# Patient Record
Sex: FEMALE | Race: WHITE | NOT HISPANIC OR LATINO | Employment: FULL TIME | URBAN - METROPOLITAN AREA
[De-identification: names, ages, dates, MRNs, and addresses within clinical notes are randomized per-mention and may not be internally consistent; named-entity substitution may affect disease eponyms.]

---

## 2017-03-07 ENCOUNTER — ALLSCRIPTS OFFICE VISIT (OUTPATIENT)
Dept: OTHER | Facility: OTHER | Age: 52
End: 2017-03-07

## 2017-05-26 ENCOUNTER — ALLSCRIPTS OFFICE VISIT (OUTPATIENT)
Dept: OTHER | Facility: OTHER | Age: 52
End: 2017-05-26

## 2017-10-31 ENCOUNTER — ALLSCRIPTS OFFICE VISIT (OUTPATIENT)
Dept: OTHER | Facility: OTHER | Age: 52
End: 2017-10-31

## 2017-10-31 DIAGNOSIS — Z12.31 ENCOUNTER FOR SCREENING MAMMOGRAM FOR MALIGNANT NEOPLASM OF BREAST: ICD-10-CM

## 2017-10-31 DIAGNOSIS — E66.9 OBESITY: ICD-10-CM

## 2017-10-31 DIAGNOSIS — R23.2 FLUSHING: ICD-10-CM

## 2017-10-31 DIAGNOSIS — G47.00 INSOMNIA: ICD-10-CM

## 2017-11-01 NOTE — PROGRESS NOTES
Assessment  1  Acute upper respiratory infection (465 9) (J06 9)   2  Colon cancer screening (V76 51) (Z12 11)   3  Visit for screening mammogram (V76 12) (Z12 31)   4  Hot flashes (782 62) (R23 2)    Plan  Acute upper respiratory infection    · Fluticasone Propionate 50 MCG/ACT Nasal Suspension; USE 2 SPRAYS IN EACH  NOSTRIL ONCE DAILY   · PredniSONE 10 MG Oral Tablet; TAKE 3 TABLET Daily  Colon cancer screening    · COLONOSCOPY; Status:Active; Requested WNL:[id number];    · 1 - Shana Simmons MD, Buster Whiting (Gastroenterology) Co-Management  *  Status: Active   Requested for: [id number]  Care Summary provided  : Yes  Hot flashes, Insomnia    · (1) TSH WITH FT4 REFLEX; Status:Active; Requested for:31Oct2017;   Obesity, Class II, BMI 35 0-39 9, with comorbidity (see actual BMI)    · (1) COMPREHENSIVE METABOLIC PANEL; Status:Active; Requested for:31Oct2017;    · (1) LIPID PANEL, FASTING; Status:Active; Requested for:31Oct2017;   Visit for screening mammogram    · * MAMMO SCREENING BILATERAL W CAD; Status:Hold For - Scheduling; Requested  for:31Oct2017;     Discussion/Summary  Possible side effects of new medications were reviewed with the patient/guardian today  The treatment plan was reviewed with the patient/guardian  The patient/guardian understands and agrees with the treatment plan      Chief Complaint  Pt here for watery eyes, post nasal drip, headaches and sweats      History of Present Illness  HPI: watery eyes for few days along with runny nose for few days flashes on and off since last week last period was last year       Cold Symptoms: Shaheen Hoyt presents with complaints of cold symptoms     Associated symptoms include sneezing,-- nasal congestion,-- runny nose,-- post nasal drainage,-- dry cough-- and-- facial pressure, but-- no scratchy throat,-- no sore throat,-- no hoarseness,-- no productive cough,-- no facial pain,-- no headache,-- no plugged ear(s),-- no ear pain,-- no swollen lymph nodes,-- no wheezing,-- no shortness of breath,-- no fatigue,-- no weakness,-- no nausea,-- no vomiting,-- no diarrhea,-- no fever-- and-- no chills  Review of Systems    Constitutional: No fever, no chills, feels well, no tiredness, no recent weight gain or loss  ENT: as noted in HPI  Cardiovascular: no complaints of slow or fast heart rate, no chest pain, no palpitations, no leg claudication or lower extremity edema  Respiratory: cough,-- wheezing-- and-- shortness of breath during exertion, but-- no shortness of breath  Active Problems  1  Abnormal CT scan, pancreas or bile duct (793 3) (R93 2)   2  Acute upper respiratory infection (465 9) (J06 9)   3  Adult BMI > 30 (V85 30)   4  Allergic rhinitis due to pollen (477 0) (J30 1)   5  Asthma (493 90) (J45 909)   6  Esophageal reflux (530 81) (K21 9)   7  Insomnia (780 52) (G47 00)   8  Migraine, unspecified, not intractable, without status migrainosus (346 90) (G43 909)   9  Morbid obesity (278 01) (E66 01)   10  Obesity, Class II, BMI 35 0-39 9, with comorbidity (see actual BMI) (278 00) (E66 9)   11  Venous insufficiency (459 81) (I87 2)   12  Vitamin D deficiency (268 9) (E55 9)   13  Well adult on routine health check (V70 0) (Z00 00)    Past Medical History  1  History of Elevated liver enzymes (790 5) (R74 8)   2  History of Encounter for cervical Pap smear with pelvic exam (V76 2,V72 31) (Z01 419)   3  History of Encounter for screening colonoscopy (V76 51) (Z12 11)   4  History of Encounter for screening mammogram for malignant neoplasm of breast   (V76 12) (Z12 31)   5  History of Foot Pain (Soft Tissue) (729 5)   6  History of fatigue (V13 89) (Z87 898)   7  History of hematuria (V13 09) (Z87 448)   8  History of viral gastroenteritis (V12 09) (Z86 19)   9  History of Limb pain (729 5) (M79 609)   10  History of Multiple joint pain (719 49) (M25 50)   11  History of Onychomycosis of toenail (110 1) (B35 1)   12   History of Right knee pain (289 46) (M25 561) 13  History of Snoring (786 09) (R06 83)   14  History of Upper back pain on right side (724 5) (M54 9)  Active Problems And Past Medical History Reviewed: The active problems and past medical history were reviewed and updated today  Family History  Mother    1  Family history of Colon Cancer (V16 0)   2  Family history of Hypertension (V17 49)  Father    3  Family history of Alcoholism   4  Family history of Father  At Age ___  Sister    11  Family history of Hypertension (V17 49)  Paternal Aunt    10  Family history of Colon Cancer (V16 0)  Family History    7  Family history of Heart Disease (V17 49)  Family History Reviewed: The family history was reviewed and updated today  Social History   · Denied: History of Alcohol Use (History)   · Occassionally   · Denied: Daily Coffee Consumption (___ Cups/Day)   · Denied: History of Drug use   · Former smoker (V15 82) (J56 207)   · Quit 4 years ago  Syble Rony Sandra Rony Smoked for at least 20 years   half a pack to one pack of cigarettes a      day  The social history was reviewed and updated today  The social history was reviewed and is unchanged  Surgical History  1  History of Dilation And Curettage  Surgical History Reviewed: The surgical history was reviewed and updated today  Current Meds   1  Amoxicillin 875 MG Oral Tablet; TAKE 1 TABLET EVERY 12 HOURS DAILY; Therapy: 45WBV1651 to (Evaluate:2017)  Requested for: 89MZJ5244; Last   Rx:2017 Ordered   2  Diclofenac Sodium 1 % Transdermal Gel; apply to the affected area BID to TID as   needed; Therapy: 36WMK0045 to (Last Rx:2016)  Requested for: 10UTE1293 Ordered   3  HydrOXYzine HCl - 50 MG Oral Tablet; TAKE 1 TABLET AT BEDTIME; Therapy: 59NOK3980 to (Tawana De La Fuenteenin)  Requested for: 2016; Last   Rx:2016 Ordered   4  Ondansetron 4 MG Oral Tablet Disintegrating; TAKE 1 TABLET Every 6 hours PRN;    Therapy: 25QTX8035 to (Evaluate:2017)  Requested for: 06RKQ9759; Last   Rx:07Mar2017 Ordered   5  Pantoprazole Sodium 40 MG Oral Tablet Delayed Release; Take 1 tablet daily; Therapy: 06HXI0018 to (Ronit Pock)  Requested for: 02SWL9440; Last   Rx:30Nov2016 Ordered   6  Topiramate 50 MG Oral Tablet; take 1 tablet every twelve hours; Therapy: 38Cky9254 to (Evaluate:68Sad8749)  Requested for: 10Aug2016; Last   Rx:24Mar2016 Ordered   7  Ventolin  (90 Base) MCG/ACT Inhalation Aerosol Solution; INHALE 1 TO 2   PUFFS EVERY 6 HOURS AS NEEDED  Requested for: 71LTK2309; Last AE:39POP7894   Ordered   8  Vitamin D3 2000 UNIT Oral Capsule Recorded    The medication list was reviewed and updated today  Allergies  1  Bactrim DS TABS  2  Seasonal    Vitals   Recorded: 57XWK4115 08:27AM   Heart Rate 7   Respiration 16   Systolic 804   Diastolic 78   Weight 510 lb 2 oz   BMI Calculated 43 95   BSA Calculated 2 12     Physical Exam    Constitutional   General appearance: No acute distress, well appearing and well nourished  Ears, Nose, Mouth, and Throat   External inspection of ears and nose: Normal     Otoscopic examination: Tympanic membranes translucent with normal light reflex  Canals patent without erythema  Nasal mucosa, septum, and turbinates: Normal without edema or erythema  Oropharynx: Normal with no erythema, edema, exudate or lesions  Pulmonary   Respiratory effort: No increased work of breathing or signs of respiratory distress  Auscultation of lungs: Clear to auscultation  Cardiovascular   Palpation of heart: Normal PMI, no thrills  Auscultation of heart: Normal rate and rhythm, normal S1 and S2, without murmurs  Lymphatic   Palpation of lymph nodes in neck: No lymphadenopathy  Results/Data  PHQ-2 Adult Depression Screening 31Oct2017 08:30AM User, Ahs     Test Name Result Flag Reference   PHQ-2 Adult Depression Score 0     Over the last two weeks, how often have you been bothered by any of the following problems?   Little interest or pleasure in doing things: Not at all - 0  Feeling down, depressed, or hopeless: Not at all - 0   PHQ-2 Adult Depression Screening Negative         Signatures   Electronically signed by : Ruben Parker MD; Oct 31 2017  8:48AM EST                       (Author)

## 2018-01-13 VITALS
SYSTOLIC BLOOD PRESSURE: 138 MMHG | DIASTOLIC BLOOD PRESSURE: 82 MMHG | BODY MASS INDEX: 43.08 KG/M2 | RESPIRATION RATE: 16 BRPM | HEART RATE: 68 BPM | TEMPERATURE: 97.9 F | WEIGHT: 243.13 LBS | HEIGHT: 63 IN

## 2018-01-13 VITALS
HEART RATE: 7 BPM | WEIGHT: 248.13 LBS | DIASTOLIC BLOOD PRESSURE: 78 MMHG | BODY MASS INDEX: 43.95 KG/M2 | RESPIRATION RATE: 16 BRPM | SYSTOLIC BLOOD PRESSURE: 122 MMHG

## 2018-01-13 NOTE — RESULT NOTES
Verified Results  * XR ABDOMEN 1 VIEW KUB 14Apr2016 03:45PM Poli George Order Number: [id number]     Test Name Result Flag Reference   XR ABDOMEN 1 VIEW KUB (Report)     ABDOMEN     INDICATION: Hematuria     COMPARISON: 8/3/2015     VIEWS: AP supine; 2 images     FINDINGS:     No radiopaque renal calculi are seen     Multiple vascular calcifications are seen in the pelvis  No definite radiopaque ureteral stone     Nonobstructive bowel gas pattern  Osseous structures appear intact  IMPRESSION:     No radiopaque urinary tract calculi identified       Workstation performed: [id number]     Signed by:   Merlene Kearney MD   4/15/16       Plan  Hematuria    · Oxycodone-Acetaminophen 5-325 MG Oral Tablet (Percocet); TAKE 1 TABLET  EVERY 12 HOURS AS NEEDED FOR PAIN   · * CT ABDOMEN PELVIS WO CONTRAST; Status:Need Information - Financial  Authorization;  Requested for:15Apr2016;     Discussion/Summary   spoke with patient    will get CT scan abdomen/pelvic    pain control PRN   - Dr Priscila Owen   Electronically signed by : Ruben Parker MD; Apr 15 2016 10:01AM EST                       (Author)

## 2018-01-14 NOTE — MISCELLANEOUS
Message  Return to work or school:   Wade Castro is under my professional care  She was seen in my office on 11/01/2017   She is able to return to work on  11/01/2017      PATIENT WAS SEEN IN OUR OFFICE 10/31/2017 EXCUSE FROM WORK FROM 10/27/2017 TO 10/31/2017 MAY RETURN TO [employer] 11/01/2017  DR TAL GARCIA / LORENZA  Signatures   Electronically signed by :  Marquis Reyes, ; Oct 31 2017  8:55AM EST                       (Author)

## 2018-01-15 VITALS
RESPIRATION RATE: 20 BRPM | WEIGHT: 246 LBS | BODY MASS INDEX: 43.58 KG/M2 | HEART RATE: 115 BPM | OXYGEN SATURATION: 98 % | SYSTOLIC BLOOD PRESSURE: 100 MMHG | TEMPERATURE: 99 F | DIASTOLIC BLOOD PRESSURE: 78 MMHG

## 2018-01-15 NOTE — RESULT NOTES
Verified Results  (1) URINE CULTURE 12Apr2016 07:35PM Melisa George     Test Name Result Flag Reference   CLINICAL REPORT (Report)     Test:        Urine culture  Specimen Source:  Urine, Unspecified Source  Specimen Type:   Urine  Specimen Date:   4/12/2016 7:35 PM  Result Date:    4/13/2016 10:13 PM  Result Status:   Final result  Resulting Lab:   [address] [address]            Tel: [phone]                 CULTURE                                       ------------------                                   10,000-19,000 cfu/ml Mixed Contaminants X3       Discussion/Summary   negative urine culture    - Dr Jeanne Molina   Electronically signed by : Brent Haley MD; Apr 14 2016 10:45AM EST                       (Author)

## 2018-01-26 ENCOUNTER — OFFICE VISIT (OUTPATIENT)
Dept: URGENT CARE | Age: 53
End: 2018-01-26
Payer: COMMERCIAL

## 2018-01-26 VITALS
SYSTOLIC BLOOD PRESSURE: 138 MMHG | BODY MASS INDEX: 42.52 KG/M2 | HEART RATE: 92 BPM | OXYGEN SATURATION: 98 % | TEMPERATURE: 98.5 F | DIASTOLIC BLOOD PRESSURE: 90 MMHG | RESPIRATION RATE: 20 BRPM | WEIGHT: 240 LBS | HEIGHT: 63 IN

## 2018-01-26 DIAGNOSIS — F41.9 ANXIETY: Primary | ICD-10-CM

## 2018-01-26 DIAGNOSIS — F32.A DEPRESSION, UNSPECIFIED DEPRESSION TYPE: ICD-10-CM

## 2018-01-26 PROCEDURE — G0382 LEV 3 HOSP TYPE B ED VISIT: HCPCS | Performed by: FAMILY MEDICINE

## 2018-01-26 PROCEDURE — S9083 URGENT CARE CENTER GLOBAL: HCPCS | Performed by: FAMILY MEDICINE

## 2018-01-26 NOTE — PATIENT INSTRUCTIONS
Work note given to be out of work until seen by PCP next week  As we discussed, go to ER if symptoms become worse prior to next week  You can try Benadryl or Melatonin to see if this helps with sleep

## 2018-01-26 NOTE — PROGRESS NOTES
Assessment/Plan:    No problem-specific Assessment & Plan notes found for this encounter  Diagnoses and all orders for this visit:    Anxiety    Depression, unspecified depression type        Patient Instructions   Work note given to be out of work until seen by PCP next week  As we discussed, go to ER if symptoms become worse prior to next week  You can try Benadryl or Melatonin to see if this helps with sleep  Subjective:      Patient ID: Aayush Montoya is a 47 y o  female  This is a 47year old male here today with complaints increased stress/ anxiety at work  She states over the last 1 5 years she has had stress at work but over the last several months it has became worse  She has hard time sleeping at night  She states her supervisor yelled at her last night and she feels as though she is target since her employer was on strike and she returned to work  This was about 1 5 years ago, she states this became worse with new supervisor  She states she does not have stress at home and she feels well at home  She just has a hard time sleeping  She denies any thoughts of self harm or harming someone else  No suicidal ideation  She has a good support system at home   here today  The following portions of the patient's history were reviewed and updated as appropriate: allergies, current medications, past family history, past medical history, past social history, past surgical history and problem list     Review of Systems   Constitutional: Negative for activity change, chills and fatigue  Respiratory: Negative for cough, shortness of breath and wheezing  Cardiovascular: Negative for chest pain  Neurological: Negative for dizziness and light-headedness  Psychiatric/Behavioral: Positive for sleep disturbance  Negative for agitation, confusion, self-injury and suicidal ideas  The patient is nervous/anxious            Objective:  /90 (BP Location: Right arm, Patient Position: Sitting, Cuff Size: Large)   Pulse 92   Temp 98 5 °F (36 9 °C) (Temporal)   Resp 20   Ht 5' 3" (1 6 m)   Wt 109 kg (240 lb)   SpO2 98%   BMI 42 51 kg/m²      Physical Exam   Constitutional: She appears well-developed and well-nourished  Patient is tearful  HENT:   Head: Normocephalic  Neck: Normal range of motion  Cardiovascular: Normal rate and regular rhythm  Pulmonary/Chest: Effort normal and breath sounds normal  No respiratory distress  Nursing note and vitals reviewed

## 2018-01-26 NOTE — LETTER
January 26, 2018     Patient: August Mayadela   YOB: 1965   Date of Visit: 1/26/2018       To Whom It May Concern: It is my medical opinion that Luci Cool may return to work on 01/31/2018  If you have any questions or concerns, please don't hesitate to call           Sincerely,        [facility]    CC: No Recipients

## 2018-01-30 ENCOUNTER — OFFICE VISIT (OUTPATIENT)
Dept: FAMILY MEDICINE CLINIC | Facility: CLINIC | Age: 53
End: 2018-01-30
Payer: COMMERCIAL

## 2018-01-30 VITALS
RESPIRATION RATE: 16 BRPM | SYSTOLIC BLOOD PRESSURE: 132 MMHG | WEIGHT: 251.8 LBS | DIASTOLIC BLOOD PRESSURE: 88 MMHG | HEIGHT: 63 IN | BODY MASS INDEX: 44.61 KG/M2 | HEART RATE: 80 BPM

## 2018-01-30 DIAGNOSIS — F41.9 ANXIETY DISORDER, UNSPECIFIED TYPE: Primary | ICD-10-CM

## 2018-01-30 PROCEDURE — 99214 OFFICE O/P EST MOD 30 MIN: CPT | Performed by: FAMILY MEDICINE

## 2018-01-30 RX ORDER — ACETAMINOPHEN 160 MG
10000 TABLET,DISINTEGRATING ORAL
COMMUNITY

## 2018-01-30 RX ORDER — HYDROXYZINE 50 MG/1
1 TABLET, FILM COATED ORAL
COMMUNITY
Start: 2016-08-10 | End: 2018-02-19 | Stop reason: SDUPTHER

## 2018-01-30 RX ORDER — ESCITALOPRAM OXALATE 10 MG/1
10 TABLET ORAL DAILY
Qty: 30 TABLET | Refills: 0 | Status: SHIPPED | OUTPATIENT
Start: 2018-01-30 | End: 2018-02-19

## 2018-01-30 RX ORDER — ONDANSETRON 4 MG/1
1 TABLET, ORALLY DISINTEGRATING ORAL EVERY 6 HOURS PRN
COMMUNITY
Start: 2017-03-07 | End: 2018-01-30 | Stop reason: ALTCHOICE

## 2018-01-30 RX ORDER — PANTOPRAZOLE SODIUM 40 MG/1
1 TABLET, DELAYED RELEASE ORAL DAILY
COMMUNITY
Start: 2013-01-11 | End: 2019-11-25 | Stop reason: ALTCHOICE

## 2018-01-30 RX ORDER — PREDNISONE 10 MG/1
3 TABLET ORAL DAILY
COMMUNITY
Start: 2017-10-31 | End: 2018-01-30 | Stop reason: ALTCHOICE

## 2018-01-30 RX ORDER — AMOXICILLIN 875 MG/1
1 TABLET, COATED ORAL EVERY 12 HOURS
COMMUNITY
Start: 2017-05-26 | End: 2018-01-30 | Stop reason: ALTCHOICE

## 2018-01-30 RX ORDER — TOPIRAMATE 50 MG/1
1 TABLET, FILM COATED ORAL EVERY 12 HOURS
COMMUNITY
Start: 2013-02-22 | End: 2019-11-25 | Stop reason: ALTCHOICE

## 2018-01-30 RX ORDER — ALBUTEROL SULFATE 90 UG/1
1-2 AEROSOL, METERED RESPIRATORY (INHALATION) EVERY 6 HOURS PRN
COMMUNITY
End: 2020-09-04 | Stop reason: SDUPTHER

## 2018-01-30 RX ORDER — ALPRAZOLAM 0.5 MG/1
0.5 TABLET ORAL
Qty: 30 TABLET | Refills: 0 | Status: SHIPPED | OUTPATIENT
Start: 2018-01-30

## 2018-01-30 RX ORDER — FLUTICASONE PROPIONATE 50 MCG
2 SPRAY, SUSPENSION (ML) NASAL DAILY
COMMUNITY
Start: 2017-10-31 | End: 2021-07-07 | Stop reason: ALTCHOICE

## 2018-01-30 NOTE — LETTER
January 30, 2018     Patient: Washington Cali   YOB: 1965   Date of Visit: 1/30/2018       To Whom it May Concern:    Darrick Harkins is under my professional care  She was seen in my office on 1/30/2018  She may return to school on 2/19/18  If you have any questions or concerns, please don't hesitate to call           Sincerely,          Mimi Estrada MD        CC: No Recipients

## 2018-01-30 NOTE — PROGRESS NOTES
Assessment/Plan:         Diagnoses and all orders for this visit:    Anxiety disorder, unspecified type  -     escitalopram (LEXAPRO) 10 mg tablet; Take 1 tablet (10 mg total) by mouth daily  -     ALPRAZolam (XANAX) 0 5 mg tablet; Take 1 tablet (0 5 mg total) by mouth daily at bedtime as needed for anxiety      spent 25 minutes and more 15 minutes was spent with counseling   Work note given up to 2/19/18    Subjective:       Patient ID: Rafal Belle is a 47 y o  female  She is here for anxiety  Harrassment at work recently, and nothing has been done about this yet  Crying all the time   Wants to take some time from work     Anxiety   Presents for initial visit  Onset was 6 to 12 months ago  The problem has been gradually worsening  Symptoms include depressed mood, excessive worry, hyperventilation, nervous/anxious behavior and panic  Patient reports no chest pain, compulsions, confusion, decreased concentration, dizziness, dry mouth, feeling of choking, impotence, insomnia, irritability, malaise, muscle tension, nausea, obsessions, restlessness, shortness of breath or suicidal ideas  The following portions of the patient's history were reviewed and updated as appropriate: allergies, current medications, past family history, past medical history, past social history, past surgical history and problem list     Review of Systems   Constitutional: Negative for irritability  Respiratory: Negative for shortness of breath  Cardiovascular: Negative for chest pain  Gastrointestinal: Negative for nausea  Genitourinary: Negative for impotence  Neurological: Negative for dizziness  Psychiatric/Behavioral: Negative for confusion, decreased concentration and suicidal ideas  The patient is nervous/anxious  The patient does not have insomnia            Past Medical History:   Diagnosis Date    Elevated liver enzymes     last assessed: 8/11/2015    Hematuria     last assessed: 4/12/2016    Onychomycosis of toenail     last assessed: 11/15/2012    Viral gastroenteritis     last assessed: 5/7/2017     Past Surgical History:   Procedure Laterality Date    DILATION AND CURETTAGE OF UTERUS       Social History     Social History    Marital status: Single     Spouse name: N/A    Number of children: N/A    Years of education: N/A     Occupational History    Not on file  Social History Main Topics    Smoking status: Never Smoker    Smokeless tobacco: Never Used      Comment: former smoker quit 4 years ago    smoked for at least 20 years    half a pack to one pack of cigarettes a day    Alcohol use No    Drug use: No    Sexual activity: Not on file     Other Topics Concern    Not on file     Social History Narrative    No narrative on file     Allergies   Allergen Reactions    Seasonal Ic  [Cholestatin]     Sulfamethoxazole-Trimethoprim      Other reaction(s): yeast infection  Annotation - [id number]: reaction is rash         Family History   Problem Relation Age of Onset    Colon cancer Mother     Hypertension Mother     Alcohol abuse Father     Hypertension Sister     Colon cancer Paternal Aunt     Heart disease Family            Current Outpatient Prescriptions:     albuterol (VENTOLIN HFA) 90 mcg/act inhaler, Inhale 1-2 puffs every 6 (six) hours as needed, Disp: , Rfl:     Cholecalciferol (VITAMIN D3) 2000 units capsule, Take by mouth, Disp: , Rfl:     diclofenac sodium (VOLTAREN) 1 %, Place on the skin, Disp: , Rfl:     fluticasone (FLONASE) 50 mcg/act nasal spray, 2 sprays into each nostril daily, Disp: , Rfl:     hydrOXYzine HCL (ATARAX) 50 mg tablet, Take 1 tablet by mouth, Disp: , Rfl:     pantoprazole (PROTONIX) 40 mg tablet, Take 1 tablet by mouth daily, Disp: , Rfl:     topiramate (TOPAMAX) 50 MG tablet, Take 1 tablet by mouth every 12 (twelve) hours, Disp: , Rfl:       Vitals:    01/30/18 1538   BP: 132/88   Pulse: 80   Resp: 16         Objective:     Physical Exam   Constitutional: She is oriented to person, place, and time  She appears well-developed  HENT:   Head: Normocephalic  Eyes: Conjunctivae are normal  Pupils are equal, round, and reactive to light  Neck: Normal range of motion  Cardiovascular: Normal rate  Pulmonary/Chest: Effort normal    Neurological: She is alert and oriented to person, place, and time  Psychiatric: Judgment normal  Her mood appears anxious  Her speech is rapid and/or pressured  She is not agitated and not combative   Cognition and memory are normal

## 2018-01-31 ENCOUNTER — TELEPHONE (OUTPATIENT)
Dept: FAMILY MEDICINE CLINIC | Facility: CLINIC | Age: 53
End: 2018-01-31

## 2018-02-13 ENCOUNTER — OFFICE VISIT (OUTPATIENT)
Dept: BEHAVIORAL/MENTAL HEALTH CLINIC | Facility: CLINIC | Age: 53
End: 2018-02-13
Payer: COMMERCIAL

## 2018-02-13 DIAGNOSIS — F32.A DEPRESSION, UNSPECIFIED DEPRESSION TYPE: ICD-10-CM

## 2018-02-13 DIAGNOSIS — F41.9 ANXIETY: Primary | ICD-10-CM

## 2018-02-13 PROCEDURE — 90834 PSYTX W PT 45 MINUTES: CPT | Performed by: SOCIAL WORKER

## 2018-02-13 NOTE — PATIENT INSTRUCTIONS
Provided supportive therapy  Reviewed coping strategies for anxiety and depression  Will consult with her PCP given current struggles  Will develop our plan moving forward after this  Provided my contact information

## 2018-02-13 NOTE — PROGRESS NOTES
Assessment/Plan:      There are no diagnoses linked to this encounter  Subjective:     Patient ID: Rafal Belle is a 47 y o  female  Since on current medication regimen, crying spells have ceased and she has been feeling less anxious  Will return to work on Monday so anxiety surrounding return  Continues to have difficulty staying asleep and is up hourly  Continues to report very little energy and motivation  Has had no side effects from currewnt dose of Lexapro  She is accompanied by spouse  She is verbal, cooperative and well oriented  Review of Systems   Psychiatric/Behavioral: Positive for dysphoric mood and sleep disturbance  The patient is nervous/anxious  Objective:     Physical Exam   Psychiatric: Her speech is normal and behavior is normal  Judgment normal  Her mood appears anxious  Cognition and memory are normal  She exhibits a depressed mood  Continued problems with lack of energy and motivation  No Si  Has good support system

## 2018-02-19 ENCOUNTER — OFFICE VISIT (OUTPATIENT)
Dept: FAMILY MEDICINE CLINIC | Facility: CLINIC | Age: 53
End: 2018-02-19
Payer: COMMERCIAL

## 2018-02-19 VITALS
WEIGHT: 254.8 LBS | HEART RATE: 76 BPM | BODY MASS INDEX: 45.14 KG/M2 | DIASTOLIC BLOOD PRESSURE: 84 MMHG | SYSTOLIC BLOOD PRESSURE: 136 MMHG | RESPIRATION RATE: 16 BRPM

## 2018-02-19 DIAGNOSIS — F32.A ANXIETY AND DEPRESSION: Primary | ICD-10-CM

## 2018-02-19 DIAGNOSIS — F41.9 ANXIETY AND DEPRESSION: Primary | ICD-10-CM

## 2018-02-19 PROCEDURE — 99214 OFFICE O/P EST MOD 30 MIN: CPT | Performed by: FAMILY MEDICINE

## 2018-02-19 RX ORDER — ESCITALOPRAM OXALATE 20 MG/1
20 TABLET ORAL DAILY
Qty: 90 TABLET | Refills: 0 | Status: SHIPPED | OUTPATIENT
Start: 2018-02-19 | End: 2018-08-08 | Stop reason: SDUPTHER

## 2018-02-19 RX ORDER — HYDROXYZINE 50 MG/1
50 TABLET, FILM COATED ORAL EVERY 8 HOURS PRN
Qty: 30 TABLET | Refills: 0 | Status: SHIPPED | OUTPATIENT
Start: 2018-02-19 | End: 2019-11-25 | Stop reason: ALTCHOICE

## 2018-02-19 NOTE — LETTER
February 19, 2018     Patient: Emily Nguyen   YOB: 1965   Date of Visit: 2/19/2018       To Whom It May Concern: It is my medical opinion that Codie Ashley may return to work on 3/12/18  If you have any questions or concerns, please don't hesitate to call           Sincerely,        Manjit Larsen MD

## 2018-02-19 NOTE — PROGRESS NOTES
Assessment/Plan:       Diagnoses and all orders for this visit:    Anxiety and depression  -     hydrOXYzine HCL (ATARAX) 50 mg tablet; Take 1 tablet (50 mg total) by mouth every 8 (eight) hours as needed for itching  -     escitalopram (LEXAPRO) 20 mg tablet; Take 1 tablet (20 mg total) by mouth daily      extended her note to 3/12/18  To see Sacha Avitia this week   F/u in 3 weeks   spent 25 minutes and more than 50% of time spent counseling   Subjective:      Patient ID: Rhonda Davidson is a 47 y o  female  Here to f/u   Been out of work 3 weeks   Feeling better but not ready to go back to work yet  There are days that she cant get out of bed to do anything     Anxiety   Presents for follow-up visit  Symptoms include depressed mood, excessive worry and nervous/anxious behavior  Patient reports no chest pain, compulsions, confusion, decreased concentration, dizziness, dry mouth, feeling of choking, hyperventilation, impotence, insomnia, irritability, malaise, muscle tension, nausea, obsessions, palpitations, panic, restlessness, shortness of breath or suicidal ideas  Symptoms occur most days  The severity of symptoms is mild  The quality of sleep is fair  Compliance with medications is %  The following portions of the patient's history were reviewed and updated as appropriate: allergies, current medications, past family history, past medical history, past social history, past surgical history and problem list     Review of Systems   Constitutional: Negative for irritability  Respiratory: Negative for shortness of breath  Cardiovascular: Negative for chest pain and palpitations  Gastrointestinal: Negative for nausea  Genitourinary: Negative for impotence  Neurological: Negative for dizziness  Psychiatric/Behavioral: Negative for confusion, decreased concentration and suicidal ideas  The patient is nervous/anxious  The patient does not have insomnia              Past Medical History: Diagnosis Date    Elevated liver enzymes     last assessed: 8/11/2015    Hematuria     last assessed: 4/12/2016    Onychomycosis of toenail     last assessed: 11/15/2012    Viral gastroenteritis     last assessed: 5/7/2017     Past Surgical History:   Procedure Laterality Date    DILATION AND CURETTAGE OF UTERUS       Social History     Social History    Marital status: Single     Spouse name: N/A    Number of children: N/A    Years of education: N/A     Occupational History    Not on file  Social History Main Topics    Smoking status: Never Smoker    Smokeless tobacco: Never Used      Comment: former smoker quit 4 years ago    smoked for at least 20 years    half a pack to one pack of cigarettes a day    Alcohol use No    Drug use: No    Sexual activity: Not on file     Other Topics Concern    Not on file     Social History Narrative    No narrative on file     Allergies   Allergen Reactions    Seasonal Ic  [Cholestatin]     Sulfamethoxazole-Trimethoprim      Other reaction(s): yeast infection  Annotation - [id number]: reaction is rash         Family History   Problem Relation Age of Onset    Colon cancer Mother     Hypertension Mother     Alcohol abuse Father     Hypertension Sister     Colon cancer Paternal Aunt     Heart disease Family        Current Outpatient Prescriptions:     albuterol (VENTOLIN HFA) 90 mcg/act inhaler, Inhale 1-2 puffs every 6 (six) hours as needed, Disp: , Rfl:     ALPRAZolam (XANAX) 0 5 mg tablet, Take 1 tablet (0 5 mg total) by mouth daily at bedtime as needed for anxiety, Disp: 30 tablet, Rfl: 0    Cholecalciferol (VITAMIN D3) 2000 units capsule, Take by mouth, Disp: , Rfl:     diclofenac sodium (VOLTAREN) 1 %, Place on the skin, Disp: , Rfl:     escitalopram (LEXAPRO) 20 mg tablet, Take 1 tablet (20 mg total) by mouth daily, Disp: 90 tablet, Rfl: 0    fluticasone (FLONASE) 50 mcg/act nasal spray, 2 sprays into each nostril daily, Disp: , Rfl:    hydrOXYzine HCL (ATARAX) 50 mg tablet, Take 1 tablet (50 mg total) by mouth every 8 (eight) hours as needed for itching, Disp: 30 tablet, Rfl: 0    pantoprazole (PROTONIX) 40 mg tablet, Take 1 tablet by mouth daily, Disp: , Rfl:     topiramate (TOPAMAX) 50 MG tablet, Take 1 tablet by mouth every 12 (twelve) hours, Disp: , Rfl:       Objective:    /84   Pulse 76   Resp 16   Wt 116 kg (254 lb 12 8 oz)   BMI 45 14 kg/m²        Physical Exam   Constitutional: She appears well-developed  HENT:   Head: Normocephalic and atraumatic  Eyes: Conjunctivae are normal  Pupils are equal, round, and reactive to light  Cardiovascular: Normal rate and regular rhythm      Pulmonary/Chest: Effort normal and breath sounds normal    Psychiatric: Thought content normal    + anxious, crying

## 2018-02-20 ENCOUNTER — OFFICE VISIT (OUTPATIENT)
Dept: BEHAVIORAL/MENTAL HEALTH CLINIC | Facility: CLINIC | Age: 53
End: 2018-02-20
Payer: COMMERCIAL

## 2018-02-20 DIAGNOSIS — F41.9 ANXIETY AND DEPRESSION: Primary | ICD-10-CM

## 2018-02-20 DIAGNOSIS — F32.A ANXIETY AND DEPRESSION: Primary | ICD-10-CM

## 2018-02-20 PROCEDURE — 90834 PSYTX W PT 45 MINUTES: CPT | Performed by: SOCIAL WORKER

## 2018-02-20 NOTE — PATIENT INSTRUCTIONS
Reviewed coping strategies for anxiety and depression  Encouraged to commit to exercise daily as well as increasing social contact/activity  Agrees to contact me overe next three weeks to report on response to current meds

## 2018-02-20 NOTE — PSYCH
Assessment/Plan:      There are no diagnoses linked to this encounter  Subjective:     Patient ID: Aayush Montoya is a 47 y o  female  [place] continues to struggle with depressive symptoms as well as feeling anxious and overwhelmed  Struggling with lack of energy and motivation as well as crying spells  Will not return to work for three weeks  Will monitor response to current medications over this time  She is pleasant, verbal, cooperatrive and well oriented during session  Review of Systems   Psychiatric/Behavioral: Positive for dysphoric mood  The patient is nervous/anxious  Objective:     Physical Exam   Psychiatric: Her speech is normal and behavior is normal  Judgment and thought content normal  Her mood appears anxious  Cognition and memory are normal  She exhibits a depressed mood  Denies any Si  Has good support system

## 2018-02-26 DIAGNOSIS — F41.9 ANXIETY DISORDER, UNSPECIFIED TYPE: ICD-10-CM

## 2018-02-26 RX ORDER — ESCITALOPRAM OXALATE 10 MG/1
10 TABLET ORAL DAILY
Qty: 30 TABLET | Refills: 0 | OUTPATIENT
Start: 2018-02-26

## 2018-03-13 ENCOUNTER — OFFICE VISIT (OUTPATIENT)
Dept: FAMILY MEDICINE CLINIC | Facility: CLINIC | Age: 53
End: 2018-03-13
Payer: COMMERCIAL

## 2018-03-13 VITALS
DIASTOLIC BLOOD PRESSURE: 82 MMHG | BODY MASS INDEX: 44.97 KG/M2 | SYSTOLIC BLOOD PRESSURE: 132 MMHG | HEART RATE: 72 BPM | WEIGHT: 253.8 LBS | RESPIRATION RATE: 16 BRPM | HEIGHT: 63 IN

## 2018-03-13 DIAGNOSIS — F41.9 ANXIETY: ICD-10-CM

## 2018-03-13 DIAGNOSIS — K21.9 GASTROESOPHAGEAL REFLUX DISEASE WITHOUT ESOPHAGITIS: ICD-10-CM

## 2018-03-13 DIAGNOSIS — J45.20 MILD INTERMITTENT ASTHMA WITHOUT COMPLICATION: ICD-10-CM

## 2018-03-13 DIAGNOSIS — F51.01 PRIMARY INSOMNIA: Primary | ICD-10-CM

## 2018-03-13 PROCEDURE — 99214 OFFICE O/P EST MOD 30 MIN: CPT | Performed by: FAMILY MEDICINE

## 2018-03-13 RX ORDER — FLUTICASONE PROPIONATE 110 UG/1
1 AEROSOL, METERED RESPIRATORY (INHALATION) 2 TIMES DAILY
Qty: 1 INHALER | Refills: 2 | Status: SHIPPED | OUTPATIENT
Start: 2018-03-13 | End: 2020-11-27 | Stop reason: SDUPTHER

## 2018-03-13 NOTE — PROGRESS NOTES
Assessment/Plan:       Diagnoses and all orders for this visit:    Primary insomnia    Anxiety    Gastroesophageal reflux disease without esophagitis    Mild intermittent asthma without complication  -     fluticasone (FLOVENT HFA) 110 MCG/ACT inhaler; Inhale 1 puff 2 (two) times a day        Subjective:      Patient ID: Christopher Webb is a 47 y o  female  Doing well on Lexapro  Less crying spell  Anxiety   Presents for follow-up visit  Symptoms include excessive worry and nervous/anxious behavior  Patient reports no chest pain, compulsions, confusion, decreased concentration, depressed mood, dizziness, dry mouth, feeling of choking, hyperventilation, impotence, insomnia, irritability, malaise, muscle tension, nausea, obsessions, palpitations, panic, restlessness, shortness of breath or suicidal ideas  Symptoms occur occasionally  The severity of symptoms is mild  The quality of sleep is good  Her past medical history is significant for asthma  Compliance with medications is %  Asthma   There is no chest tightness, cough, difficulty breathing, frequent throat clearing, hemoptysis, hoarse voice, shortness of breath, sputum production or wheezing  This is a chronic problem  The current episode started more than 1 year ago  The problem occurs intermittently  The problem has been gradually improving  Pertinent negatives include no appetite change, chest pain, dyspnea on exertion, ear congestion, ear pain, fever, headaches, heartburn, malaise/fatigue, myalgias, nasal congestion, orthopnea, PND, postnasal drip, rhinorrhea, sneezing, sore throat, sweats, trouble swallowing or weight loss  Her symptoms are aggravated by climbing stairs  Her symptoms are alleviated by nothing  She reports minimal improvement on treatment  Her past medical history is significant for asthma  There is no history of bronchiectasis, bronchitis, COPD, emphysema or pneumonia         The following portions of the patient's history were reviewed and updated as appropriate: allergies, current medications, past family history, past medical history, past social history, past surgical history and problem list     Review of Systems   Constitutional: Negative for appetite change, fever, irritability, malaise/fatigue and weight loss  HENT: Negative for ear pain, hoarse voice, postnasal drip, rhinorrhea, sneezing, sore throat and trouble swallowing  Respiratory: Negative for cough, hemoptysis, sputum production, shortness of breath and wheezing  Cardiovascular: Negative for chest pain, dyspnea on exertion, palpitations and PND  Gastrointestinal: Negative for heartburn and nausea  Genitourinary: Negative for impotence  Musculoskeletal: Negative for myalgias  Neurological: Negative for dizziness and headaches  Psychiatric/Behavioral: Negative for confusion, decreased concentration and suicidal ideas  The patient is nervous/anxious  The patient does not have insomnia  No past medical history on file  Past Surgical History:   Procedure Laterality Date    DILATION AND CURETTAGE OF UTERUS       Social History     Social History    Marital status: Single     Spouse name: N/A    Number of children: N/A    Years of education: N/A     Occupational History    Not on file  Social History Main Topics    Smoking status: Never Smoker    Smokeless tobacco: Never Used      Comment: former smoker quit 4 years ago    smoked for at least 20 years    half a pack to one pack of cigarettes a day    Alcohol use No    Drug use: No    Sexual activity: Not on file     Other Topics Concern    Not on file     Social History Narrative    No narrative on file     Allergies   Allergen Reactions    Seasonal Ic  [Cholestatin]     Sulfamethoxazole-Trimethoprim      Other reaction(s): yeast infection  Annotation - [id number]: reaction is rash         Family History   Problem Relation Age of Onset    Colon cancer Mother     Hypertension Mother     Alcohol abuse Father     Hypertension Sister     Colon cancer Paternal Aunt     Heart disease Family            Current Outpatient Prescriptions:     albuterol (VENTOLIN HFA) 90 mcg/act inhaler, Inhale 1-2 puffs every 6 (six) hours as needed, Disp: , Rfl:     ALPRAZolam (XANAX) 0 5 mg tablet, Take 1 tablet (0 5 mg total) by mouth daily at bedtime as needed for anxiety, Disp: 30 tablet, Rfl: 0    Cholecalciferol (VITAMIN D3) 2000 units capsule, Take by mouth, Disp: , Rfl:     diclofenac sodium (VOLTAREN) 1 %, Place on the skin, Disp: , Rfl:     escitalopram (LEXAPRO) 20 mg tablet, Take 1 tablet (20 mg total) by mouth daily, Disp: 90 tablet, Rfl: 0    fluticasone (FLONASE) 50 mcg/act nasal spray, 2 sprays into each nostril daily, Disp: , Rfl:     fluticasone (FLOVENT HFA) 110 MCG/ACT inhaler, Inhale 1 puff 2 (two) times a day, Disp: 1 Inhaler, Rfl: 2    hydrOXYzine HCL (ATARAX) 50 mg tablet, Take 1 tablet (50 mg total) by mouth every 8 (eight) hours as needed for itching, Disp: 30 tablet, Rfl: 0    pantoprazole (PROTONIX) 40 mg tablet, Take 1 tablet by mouth daily, Disp: , Rfl:     topiramate (TOPAMAX) 50 MG tablet, Take 1 tablet by mouth every 12 (twelve) hours, Disp: , Rfl:     Objective:      /82   Pulse 72   Resp 16   Ht 5' 3" (1 6 m)   Wt 115 kg (253 lb 12 8 oz)   BMI 44 96 kg/m²          Physical Exam   Constitutional: She is oriented to person, place, and time  Vital signs are normal  She appears well-developed and well-nourished  HENT:   Head: Normocephalic and atraumatic  Nose: Nose normal    Mouth/Throat: Oropharynx is clear and moist    Eyes: Pupils are equal, round, and reactive to light  Neck: Normal range of motion  Neck supple  No thyromegaly present  Cardiovascular: Normal rate and regular rhythm  No murmur heard  Pulmonary/Chest: Effort normal and breath sounds normal    Abdominal: Soft  Bowel sounds are normal    Musculoskeletal: Normal range of motion  She exhibits no edema or deformity  Neurological: She is alert and oriented to person, place, and time  She has normal reflexes  Skin: Skin is warm  No rash noted  No erythema  Psychiatric: She has a normal mood and affect   Her behavior is normal

## 2018-06-18 ENCOUNTER — OFFICE VISIT (OUTPATIENT)
Dept: FAMILY MEDICINE CLINIC | Facility: CLINIC | Age: 53
End: 2018-06-18
Payer: COMMERCIAL

## 2018-06-18 VITALS
RESPIRATION RATE: 14 BRPM | HEART RATE: 84 BPM | DIASTOLIC BLOOD PRESSURE: 80 MMHG | SYSTOLIC BLOOD PRESSURE: 132 MMHG | BODY MASS INDEX: 45.29 KG/M2 | HEIGHT: 63 IN | WEIGHT: 255.6 LBS

## 2018-06-18 DIAGNOSIS — M25.561 ACUTE PAIN OF RIGHT KNEE: ICD-10-CM

## 2018-06-18 DIAGNOSIS — Z00.00 WELL ADULT EXAM: Primary | ICD-10-CM

## 2018-06-18 DIAGNOSIS — Z12.11 COLON CANCER SCREENING: ICD-10-CM

## 2018-06-18 DIAGNOSIS — IMO0001 OBESITY, CLASS II, BMI 35.0-39.9, WITH COMORBIDITY (SEE ACTUAL BMI): ICD-10-CM

## 2018-06-18 DIAGNOSIS — E55.9 VITAMIN D DEFICIENCY: ICD-10-CM

## 2018-06-18 DIAGNOSIS — Z12.31 VISIT FOR SCREENING MAMMOGRAM: ICD-10-CM

## 2018-06-18 PROCEDURE — 99396 PREV VISIT EST AGE 40-64: CPT | Performed by: FAMILY MEDICINE

## 2018-06-18 NOTE — PROGRESS NOTES
Julia Fernandez was seen today for follow-up  Diagnoses and all orders for this visit:    Well adult exam    Obesity, Class II, BMI 35 0-39 9, with comorbidity (see actual BMI)  -     Comprehensive metabolic panel  -     Lipid Panel with Direct LDL reflex    Acute pain of right knee  -     Ambulatory referral to Orthopedic Surgery; Future  -     XR knee 3 vw right non injury; Future    Vitamin D deficiency  -     Vitamin D 25 hydroxy    Visit for screening mammogram  -     Mammo screening bilateral w cad; Future    Colon cancer screening  -     Ambulatory referral to Gastroenterology; Future      Patient Counseling:  --Nutrition: Stressed importance of moderation in sodium/caffeine intake, saturated fat and cholesterol, caloric balance, sufficient intake of fresh fruits, vegetables, fiber, calcium, iron, and 1 mg of folate supplement per day   --Discussed  calcium supplement, and the daily use of baby aspirin  --Exercise: Stressed the importance of regular exercise  --Substance Abuse: Discussed cessation/primary prevention of tobacco, alcohol, or other drug use; driving or other dangerous activities under the influence; availability of treatment for abuse  --Sexuality: Discussed sexually transmitted diseases, partner selection, use of condoms, avoidance of unintended pregnancy  and contraceptive alternatives  --Injury prevention: Discussed safety belts, safety helmets, smoke detector, smoking near bedding or upholstery  --Dental health: Discussed importance of regular tooth brushing, flossing, and dental visits  --Immunizations reviewed  --Discussed benefits of screening colonoscopy    Chief Complaint   Patient presents with    Follow-up     pt here for follow up appt knee has gotten worse as well        Knee Pain    The incident occurred more than 1 week ago  There was no injury mechanism  The pain is present in the right knee  The pain is at a severity of 3/10  The pain is mild   Associated symptoms include an inability to bear weight  Pertinent negatives include no muscle weakness  She has tried acetaminophen, non-weight bearing and NSAIDs for the symptoms  The treatment provided mild relief  Patient here for a comprehensive physical exam The patient reports no problems other than right knee pain daily, she does physical work     Do you take any herbs or supplements that were not prescribed by a doctor? no   Are you taking calcium supplements? no   Are you taking aspirin daily? no  How often do you exercise? Not able to exercise due to knee pain  Diet? 2 servings of fruits and vegetables   Dental visit:  Yearly     Vision test: wears glasses     Colonoscopy:  Never had one done      History:  LMP: No LMP recorded  Patient has had a hysterectomy  Last pap date:   Abnormal pap? no  : 2  Para: 2        History   Sexual Activity    Sexual activity: Not on file         Review of Systems   Constitutional: Negative  HENT: Negative  Eyes: Negative  Respiratory: Negative  Cardiovascular: Negative  Gastrointestinal: Negative  Endocrine: Negative  Genitourinary: Negative  Musculoskeletal: Positive for arthralgias  Skin: Negative  Allergic/Immunologic: Negative  Neurological: Negative  Hematological: Negative  Psychiatric/Behavioral: Negative  Family History   Problem Relation Age of Onset    Colon cancer Mother     Hypertension Mother     Alcohol abuse Father     Hypertension Sister     Colon cancer Paternal Aunt     Heart disease Family        No past medical history on file  Social History     Social History    Marital status: Single     Spouse name: N/A    Number of children: N/A    Years of education: N/A     Occupational History    Not on file  Social History Main Topics    Smoking status: Never Smoker    Smokeless tobacco: Never Used      Comment: former smoker quit 4 years ago    smoked for at least 20 years    half a pack to one pack of cigarettes a day    Alcohol use No    Drug use: No    Sexual activity: Not on file     Other Topics Concern    Not on file     Social History Narrative    No narrative on file       Current Outpatient Prescriptions on File Prior to Visit   Medication Sig Dispense Refill    albuterol (VENTOLIN HFA) 90 mcg/act inhaler Inhale 1-2 puffs every 6 (six) hours as needed      ALPRAZolam (XANAX) 0 5 mg tablet Take 1 tablet (0 5 mg total) by mouth daily at bedtime as needed for anxiety 30 tablet 0    Cholecalciferol (VITAMIN D3) 2000 units capsule Take by mouth      diclofenac sodium (VOLTAREN) 1 % Place on the skin      escitalopram (LEXAPRO) 20 mg tablet Take 1 tablet (20 mg total) by mouth daily 90 tablet 0    fluticasone (FLONASE) 50 mcg/act nasal spray 2 sprays into each nostril daily      fluticasone (FLOVENT HFA) 110 MCG/ACT inhaler Inhale 1 puff 2 (two) times a day 1 Inhaler 2    hydrOXYzine HCL (ATARAX) 50 mg tablet Take 1 tablet (50 mg total) by mouth every 8 (eight) hours as needed for itching 30 tablet 0    pantoprazole (PROTONIX) 40 mg tablet Take 1 tablet by mouth daily      topiramate (TOPAMAX) 50 MG tablet Take 1 tablet by mouth every 12 (twelve) hours       No current facility-administered medications on file prior to visit  Allergies   Allergen Reactions    Seasonal Ic  [Cholestatin]     Sulfamethoxazole-Trimethoprim      Other reaction(s): yeast infection  Annotation - [id number]: reaction is rash         Vitals:    06/18/18 1251   BP: 132/80   Pulse: 84   Resp: 14   Weight: 116 kg (255 lb 9 6 oz)   Height: 5' 3" (1 6 m)         Physical Exam   Constitutional: She is oriented to person, place, and time  Vital signs are normal  She appears well-developed and well-nourished  HENT:   Head: Normocephalic and atraumatic  Nose: Nose normal    Mouth/Throat: Oropharynx is clear and moist    Eyes: Pupils are equal, round, and reactive to light  Neck: Normal range of motion  Neck supple  No thyromegaly present  Cardiovascular: Normal rate and regular rhythm  No murmur heard  Pulmonary/Chest: Effort normal and breath sounds normal    Abdominal: Soft  Bowel sounds are normal    Musculoskeletal: Normal range of motion  She exhibits no edema or deformity  Neurological: She is alert and oriented to person, place, and time  She has normal reflexes  Skin: Skin is warm  No rash noted  No erythema  Psychiatric: She has a normal mood and affect   Her behavior is normal

## 2018-06-29 ENCOUNTER — OFFICE VISIT (OUTPATIENT)
Dept: FAMILY MEDICINE CLINIC | Facility: CLINIC | Age: 53
End: 2018-06-29
Payer: COMMERCIAL

## 2018-06-29 VITALS
HEART RATE: 78 BPM | SYSTOLIC BLOOD PRESSURE: 122 MMHG | WEIGHT: 254 LBS | BODY MASS INDEX: 45 KG/M2 | HEIGHT: 63 IN | DIASTOLIC BLOOD PRESSURE: 76 MMHG | RESPIRATION RATE: 16 BRPM

## 2018-06-29 DIAGNOSIS — J01.00 ACUTE NON-RECURRENT MAXILLARY SINUSITIS: Primary | ICD-10-CM

## 2018-06-29 PROCEDURE — 99213 OFFICE O/P EST LOW 20 MIN: CPT | Performed by: FAMILY MEDICINE

## 2018-06-29 RX ORDER — FLUTICASONE PROPIONATE 50 MCG
1 SPRAY, SUSPENSION (ML) NASAL DAILY
Qty: 16 G | Refills: 0 | Status: SHIPPED | OUTPATIENT
Start: 2018-06-29 | End: 2022-04-14

## 2018-06-29 RX ORDER — AZITHROMYCIN 250 MG/1
TABLET, FILM COATED ORAL
Qty: 6 TABLET | Refills: 0 | Status: SHIPPED | OUTPATIENT
Start: 2018-06-29 | End: 2018-07-02

## 2018-06-29 RX ORDER — BENZONATATE 200 MG/1
200 CAPSULE ORAL 3 TIMES DAILY PRN
Qty: 20 CAPSULE | Refills: 0 | Status: SHIPPED | OUTPATIENT
Start: 2018-06-29 | End: 2019-11-25 | Stop reason: ALTCHOICE

## 2018-06-29 NOTE — PROGRESS NOTES
Assessment/Plan:         Diagnoses and all orders for this visit:    Acute non-recurrent maxillary sinusitis  -     azithromycin (ZITHROMAX) 250 mg tablet; 2 tabs x 1 day, 1 tab x 4 days  -     benzonatate (TESSALON) 200 MG capsule; Take 1 capsule (200 mg total) by mouth 3 (three) times a day as needed for cough  -     fluticasone (FLONASE) 50 mcg/act nasal spray; 1 spray into each nostril daily          Subjective:      Patient ID: Kimo Mcaky is a 47 y o  female  Cough   This is a new problem  The current episode started in the past 7 days  The problem has been waxing and waning  The problem occurs constantly  The cough is productive of purulent sputum  Associated symptoms include ear congestion, nasal congestion, postnasal drip and rhinorrhea  Pertinent negatives include no chest pain, chills, ear pain, fever, headaches, heartburn, hemoptysis, myalgias, rash, sore throat, shortness of breath, sweats, weight loss or wheezing  Nothing aggravates the symptoms  She has tried nothing for the symptoms  The treatment provided mild relief  There is no history of asthma, bronchiectasis, bronchitis, COPD, emphysema, environmental allergies or pneumonia  The following portions of the patient's history were reviewed and updated as appropriate: allergies, current medications, past family history, past medical history, past social history, past surgical history and problem list     Review of Systems   Constitutional: Negative for chills, fever and weight loss  HENT: Positive for postnasal drip and rhinorrhea  Negative for ear pain and sore throat  Respiratory: Positive for cough  Negative for hemoptysis, shortness of breath and wheezing  Cardiovascular: Negative for chest pain  Gastrointestinal: Negative for heartburn  Musculoskeletal: Negative for myalgias  Skin: Negative for rash  Allergic/Immunologic: Negative for environmental allergies  Neurological: Negative for headaches               No past medical history on file  Past Surgical History:   Procedure Laterality Date    DILATION AND CURETTAGE OF UTERUS       Social History     Social History    Marital status: Single     Spouse name: N/A    Number of children: N/A    Years of education: N/A     Occupational History    Not on file  Social History Main Topics    Smoking status: Never Smoker    Smokeless tobacco: Never Used      Comment: former smoker quit 4 years ago    smoked for at least 20 years    half a pack to one pack of cigarettes a day    Alcohol use No    Drug use: No    Sexual activity: Not on file     Other Topics Concern    Not on file     Social History Narrative    No narrative on file     Allergies   Allergen Reactions    Seasonal Ic  [Cholestatin]     Sulfamethoxazole-Trimethoprim      Other reaction(s): yeast infection  Annotation - [id number]: reaction is rash         Family History   Problem Relation Age of Onset    Colon cancer Mother     Hypertension Mother     Alcohol abuse Father     Hypertension Sister     Colon cancer Paternal Aunt     Heart disease Family            Current Outpatient Prescriptions:     albuterol (VENTOLIN HFA) 90 mcg/act inhaler, Inhale 1-2 puffs every 6 (six) hours as needed, Disp: , Rfl:     ALPRAZolam (XANAX) 0 5 mg tablet, Take 1 tablet (0 5 mg total) by mouth daily at bedtime as needed for anxiety, Disp: 30 tablet, Rfl: 0    Cholecalciferol (VITAMIN D3) 2000 units capsule, Take by mouth, Disp: , Rfl:     diclofenac sodium (VOLTAREN) 1 %, Place on the skin, Disp: , Rfl:     escitalopram (LEXAPRO) 20 mg tablet, Take 1 tablet (20 mg total) by mouth daily, Disp: 90 tablet, Rfl: 0    fluticasone (FLONASE) 50 mcg/act nasal spray, 2 sprays into each nostril daily, Disp: , Rfl:     fluticasone (FLOVENT HFA) 110 MCG/ACT inhaler, Inhale 1 puff 2 (two) times a day, Disp: 1 Inhaler, Rfl: 2    hydrOXYzine HCL (ATARAX) 50 mg tablet, Take 1 tablet (50 mg total) by mouth every 8 (eight) hours as needed for itching, Disp: 30 tablet, Rfl: 0    pantoprazole (PROTONIX) 40 mg tablet, Take 1 tablet by mouth daily, Disp: , Rfl:     topiramate (TOPAMAX) 50 MG tablet, Take 1 tablet by mouth every 12 (twelve) hours, Disp: , Rfl:     azithromycin (ZITHROMAX) 250 mg tablet, 2 tabs x 1 day, 1 tab x 4 days, Disp: 6 tablet, Rfl: 0    benzonatate (TESSALON) 200 MG capsule, Take 1 capsule (200 mg total) by mouth 3 (three) times a day as needed for cough, Disp: 20 capsule, Rfl: 0    fluticasone (FLONASE) 50 mcg/act nasal spray, 1 spray into each nostril daily, Disp: 16 g, Rfl: 0        Objective:      /76 (BP Location: Left arm, Patient Position: Sitting, Cuff Size: Standard)   Pulse 78   Resp 16   Ht 5' 3" (1 6 m)   Wt 115 kg (254 lb)   BMI 44 99 kg/m²          Physical Exam   Constitutional: She appears well-developed and well-nourished  HENT:   Head: Normocephalic and atraumatic  Nose: Right sinus exhibits maxillary sinus tenderness  Left sinus exhibits maxillary sinus tenderness  Mouth/Throat: No oropharyngeal exudate  Eyes: EOM are normal  Pupils are equal, round, and reactive to light  Cardiovascular: Normal rate and regular rhythm      Pulmonary/Chest: Effort normal and breath sounds normal

## 2018-06-29 NOTE — LETTER
June 29, 2018     Patient: Ina Montes   YOB: 1965   Date of Visit: 6/29/2018       To Whom it May Concern:    Madison Olsenpherd is under my professional care  She was seen in my office on 6/29/2018  She may return to work on 7/3/18  If you have any questions or concerns, please don't hesitate to call           Sincerely,          Ye Cardona MD

## 2018-07-18 ENCOUNTER — TELEPHONE (OUTPATIENT)
Dept: FAMILY MEDICINE CLINIC | Facility: CLINIC | Age: 53
End: 2018-07-18

## 2018-07-18 DIAGNOSIS — J32.0 MAXILLARY SINUSITIS, UNSPECIFIED CHRONICITY: Primary | ICD-10-CM

## 2018-07-18 RX ORDER — PREDNISONE 10 MG/1
TABLET ORAL
Qty: 12 TABLET | Refills: 0 | Status: SHIPPED | OUTPATIENT
Start: 2018-07-18 | End: 2018-08-08 | Stop reason: ALTCHOICE

## 2018-08-08 ENCOUNTER — OFFICE VISIT (OUTPATIENT)
Dept: FAMILY MEDICINE CLINIC | Facility: CLINIC | Age: 53
End: 2018-08-08
Payer: COMMERCIAL

## 2018-08-08 VITALS
RESPIRATION RATE: 12 BRPM | BODY MASS INDEX: 46.03 KG/M2 | HEIGHT: 63 IN | HEART RATE: 86 BPM | OXYGEN SATURATION: 98 % | DIASTOLIC BLOOD PRESSURE: 80 MMHG | SYSTOLIC BLOOD PRESSURE: 124 MMHG | WEIGHT: 259.8 LBS

## 2018-08-08 DIAGNOSIS — IMO0001 OBESITY, CLASS II, BMI 35.0-39.9, WITH COMORBIDITY (SEE ACTUAL BMI): ICD-10-CM

## 2018-08-08 DIAGNOSIS — M79.89 BILATERAL SWELLING OF FEET: Primary | ICD-10-CM

## 2018-08-08 DIAGNOSIS — J45.20 MILD INTERMITTENT ASTHMA WITHOUT COMPLICATION: ICD-10-CM

## 2018-08-08 DIAGNOSIS — Z13.220 LIPID SCREENING: ICD-10-CM

## 2018-08-08 DIAGNOSIS — E55.9 VITAMIN D DEFICIENCY: ICD-10-CM

## 2018-08-08 DIAGNOSIS — M79.605 PAIN IN BOTH LOWER EXTREMITIES: ICD-10-CM

## 2018-08-08 DIAGNOSIS — F32.A ANXIETY AND DEPRESSION: ICD-10-CM

## 2018-08-08 DIAGNOSIS — F41.9 ANXIETY AND DEPRESSION: ICD-10-CM

## 2018-08-08 DIAGNOSIS — M79.604 PAIN IN BOTH LOWER EXTREMITIES: ICD-10-CM

## 2018-08-08 PROCEDURE — 99214 OFFICE O/P EST MOD 30 MIN: CPT | Performed by: FAMILY MEDICINE

## 2018-08-08 RX ORDER — HYDROCHLOROTHIAZIDE 12.5 MG/1
12.5 CAPSULE, GELATIN COATED ORAL EVERY MORNING
Qty: 90 CAPSULE | Refills: 0 | Status: SHIPPED | OUTPATIENT
Start: 2018-08-08 | End: 2019-11-25

## 2018-08-08 RX ORDER — ESCITALOPRAM OXALATE 20 MG/1
20 TABLET ORAL DAILY
Qty: 90 TABLET | Refills: 0 | Status: SHIPPED | OUTPATIENT
Start: 2018-08-08 | End: 2019-11-25 | Stop reason: ALTCHOICE

## 2018-08-08 NOTE — PROGRESS NOTES
Assessment/Plan:         Diagnoses and all orders for this visit:    Bilateral swelling of feet  -     Comprehensive metabolic panel  -     CBC and differential; Future  -     hydrochlorothiazide (MICROZIDE) 12 5 mg capsule; Take 1 capsule (12 5 mg total) by mouth every morning    Obesity, Class II, BMI 35 0-39 9, with comorbidity (see actual BMI)  -     Lipid Panel with Direct LDL reflex; Future    Pain in both lower extremities    Vitamin D deficiency  -     Vitamin D 25 hydroxy; Future    Lipid screening  -     Lipid Panel with Direct LDL reflex; Future    Anxiety and depression  -     escitalopram (LEXAPRO) 20 mg tablet; Take 1 tablet (20 mg total) by mouth daily    Mild intermittent asthma without complication      started her on HCTZ today   Prescription given for compressions stockings   To get blood work done   Leg pain most likely due to her back - Ibuprofen PRN   Continue current meds  F/u in 1 month     Subjective:      Patient ID: Jarrod Lilly is a 47 y o  female  Anxiety   Presents for follow-up visit  Patient reports no chest pain, compulsions, confusion, decreased concentration, depressed mood, dizziness, dry mouth, feeling of choking, hyperventilation, impotence, insomnia, irritability, muscle tension, nausea, nervous/anxious behavior, obsessions, palpitations, panic, restlessness, shortness of breath or suicidal ideas  Symptoms occur occasionally  The quality of sleep is good  Nighttime awakenings: none  Her past medical history is significant for asthma  Compliance with medications is %  Leg Pain    The incident occurred more than 1 week ago  There was no injury mechanism  The pain is present in the left leg and right leg  The pain is at a severity of 2/10  The pain is mild  The pain has been intermittent since onset  Pertinent negatives include no inability to bear weight, loss of motion, loss of sensation, muscle weakness, numbness or tingling   The symptoms are aggravated by movement and weight bearing  The treatment provided mild relief  Asthma   There is no chest tightness, cough, difficulty breathing, frequent throat clearing, hemoptysis, hoarse voice, shortness of breath, sputum production or wheezing  This is a chronic problem  The current episode started more than 1 year ago  The problem occurs intermittently  Pertinent negatives include no appetite change, chest pain, rhinorrhea or sneezing  Her past medical history is significant for asthma  The following portions of the patient's history were reviewed and updated as appropriate: allergies, current medications, past family history, past medical history, past social history, past surgical history and problem list     Review of Systems   Constitutional: Negative for appetite change and irritability  HENT: Negative for hoarse voice, rhinorrhea and sneezing  Respiratory: Negative for cough, hemoptysis, sputum production, shortness of breath and wheezing  Cardiovascular: Negative for chest pain and palpitations  Gastrointestinal: Negative for nausea  Genitourinary: Negative for impotence  Neurological: Negative for dizziness, tingling and numbness  Psychiatric/Behavioral: Negative for confusion, decreased concentration and suicidal ideas  The patient is not nervous/anxious and does not have insomnia  No past medical history on file  Past Surgical History:   Procedure Laterality Date    DILATION AND CURETTAGE OF UTERUS       Social History     Social History    Marital status: Single     Spouse name: N/A    Number of children: N/A    Years of education: N/A     Occupational History    Not on file  Social History Main Topics    Smoking status: Never Smoker    Smokeless tobacco: Never Used      Comment: former smoker quit 4 years ago    smoked for at least 20 years    half a pack to one pack of cigarettes a day    Alcohol use No    Drug use: No    Sexual activity: Not on file     Other Topics Concern    Not on file     Social History Narrative    No narrative on file     Allergies   Allergen Reactions    Seasonal Ic  [Cholestatin]     Sulfamethoxazole-Trimethoprim      Other reaction(s): yeast infection  Annotation - [id number]: reaction is rash         Family History   Problem Relation Age of Onset    Colon cancer Mother     Hypertension Mother     Alcohol abuse Father     Hypertension Sister     Colon cancer Paternal Aunt     Heart disease Family            Current Outpatient Prescriptions:     albuterol (VENTOLIN HFA) 90 mcg/act inhaler, Inhale 1-2 puffs every 6 (six) hours as needed, Disp: , Rfl:     ALPRAZolam (XANAX) 0 5 mg tablet, Take 1 tablet (0 5 mg total) by mouth daily at bedtime as needed for anxiety, Disp: 30 tablet, Rfl: 0    benzonatate (TESSALON) 200 MG capsule, Take 1 capsule (200 mg total) by mouth 3 (three) times a day as needed for cough, Disp: 20 capsule, Rfl: 0    Cholecalciferol (VITAMIN D3) 2000 units capsule, Take by mouth, Disp: , Rfl:     diclofenac sodium (VOLTAREN) 1 %, Place on the skin, Disp: , Rfl:     fluticasone (FLONASE) 50 mcg/act nasal spray, 2 sprays into each nostril daily, Disp: , Rfl:     fluticasone (FLONASE) 50 mcg/act nasal spray, 1 spray into each nostril daily, Disp: 16 g, Rfl: 0    fluticasone (FLOVENT HFA) 110 MCG/ACT inhaler, Inhale 1 puff 2 (two) times a day, Disp: 1 Inhaler, Rfl: 2    hydrOXYzine HCL (ATARAX) 50 mg tablet, Take 1 tablet (50 mg total) by mouth every 8 (eight) hours as needed for itching, Disp: 30 tablet, Rfl: 0    pantoprazole (PROTONIX) 40 mg tablet, Take 1 tablet by mouth daily, Disp: , Rfl:     topiramate (TOPAMAX) 50 MG tablet, Take 1 tablet by mouth every 12 (twelve) hours, Disp: , Rfl:     escitalopram (LEXAPRO) 20 mg tablet, Take 1 tablet (20 mg total) by mouth daily, Disp: 90 tablet, Rfl: 0    hydrochlorothiazide (MICROZIDE) 12 5 mg capsule, Take 1 capsule (12 5 mg total) by mouth every morning, Disp: 90 capsule, Rfl: 0      Objective:      /80   Pulse 86   Resp 12   Ht 5' 3" (1 6 m)   Wt 118 kg (259 lb 12 8 oz)   SpO2 98%   BMI 46 02 kg/m²          Physical Exam   Constitutional: She appears well-developed and well-nourished  HENT:   Head: Normocephalic and atraumatic  Eyes: EOM are normal  Pupils are equal, round, and reactive to light  Neck: Normal range of motion  Neck supple  Cardiovascular: Normal rate and regular rhythm  1+ bilateral edema feet    Pulmonary/Chest: Effort normal and breath sounds normal    Musculoskeletal: She exhibits no tenderness  Psychiatric: She has a normal mood and affect   Her behavior is normal

## 2018-09-19 ENCOUNTER — HOSPITAL ENCOUNTER (OUTPATIENT)
Dept: RADIOLOGY | Facility: HOSPITAL | Age: 53
Discharge: HOME | End: 2018-09-19
Payer: COMMERCIAL

## 2018-09-19 DIAGNOSIS — M25.561 ACUTE PAIN OF RIGHT KNEE: ICD-10-CM

## 2018-09-19 PROCEDURE — 73562 X-RAY EXAM OF KNEE 3: CPT

## 2018-09-26 ENCOUNTER — APPOINTMENT (OUTPATIENT)
Dept: RADIOLOGY | Facility: CLINIC | Age: 53
End: 2018-09-26
Payer: COMMERCIAL

## 2018-09-26 ENCOUNTER — OFFICE VISIT (OUTPATIENT)
Dept: OBGYN CLINIC | Facility: CLINIC | Age: 53
End: 2018-09-26
Payer: COMMERCIAL

## 2018-09-26 VITALS
HEART RATE: 116 BPM | SYSTOLIC BLOOD PRESSURE: 140 MMHG | DIASTOLIC BLOOD PRESSURE: 86 MMHG | WEIGHT: 259 LBS | BODY MASS INDEX: 45.89 KG/M2 | HEIGHT: 63 IN

## 2018-09-26 DIAGNOSIS — M25.562 LEFT KNEE PAIN, UNSPECIFIED CHRONICITY: ICD-10-CM

## 2018-09-26 DIAGNOSIS — M22.2X2 PATELLOFEMORAL SYNDROME OF BOTH KNEES: Primary | ICD-10-CM

## 2018-09-26 DIAGNOSIS — M22.2X2 PATELLOFEMORAL SYNDROME OF BOTH KNEES: ICD-10-CM

## 2018-09-26 DIAGNOSIS — M25.561 ACUTE PAIN OF RIGHT KNEE: ICD-10-CM

## 2018-09-26 DIAGNOSIS — M17.0 ARTHRITIS OF BOTH KNEES: ICD-10-CM

## 2018-09-26 DIAGNOSIS — M22.2X1 PATELLOFEMORAL SYNDROME OF BOTH KNEES: Primary | ICD-10-CM

## 2018-09-26 DIAGNOSIS — M22.2X1 PATELLOFEMORAL SYNDROME OF BOTH KNEES: ICD-10-CM

## 2018-09-26 PROCEDURE — 73562 X-RAY EXAM OF KNEE 3: CPT

## 2018-09-26 PROCEDURE — 20610 DRAIN/INJ JOINT/BURSA W/O US: CPT | Performed by: ORTHOPAEDIC SURGERY

## 2018-09-26 PROCEDURE — 99204 OFFICE O/P NEW MOD 45 MIN: CPT | Performed by: ORTHOPAEDIC SURGERY

## 2018-09-26 RX ORDER — MELOXICAM 7.5 MG/1
7.5 TABLET ORAL DAILY
Qty: 60 TABLET | Refills: 0 | Status: SHIPPED | OUTPATIENT
Start: 2018-09-26 | End: 2019-11-25 | Stop reason: ALTCHOICE

## 2018-09-26 RX ORDER — BETAMETHASONE SODIUM PHOSPHATE AND BETAMETHASONE ACETATE 3; 3 MG/ML; MG/ML
6 INJECTION, SUSPENSION INTRA-ARTICULAR; INTRALESIONAL; INTRAMUSCULAR; SOFT TISSUE
Status: COMPLETED | OUTPATIENT
Start: 2018-09-26 | End: 2018-09-26

## 2018-09-26 RX ORDER — BUPIVACAINE HYDROCHLORIDE 2.5 MG/ML
2 INJECTION, SOLUTION INFILTRATION; PERINEURAL
Status: COMPLETED | OUTPATIENT
Start: 2018-09-26 | End: 2018-09-26

## 2018-09-26 RX ADMIN — BETAMETHASONE SODIUM PHOSPHATE AND BETAMETHASONE ACETATE 6 MG: 3; 3 INJECTION, SUSPENSION INTRA-ARTICULAR; INTRALESIONAL; INTRAMUSCULAR; SOFT TISSUE at 10:58

## 2018-09-26 RX ADMIN — BUPIVACAINE HYDROCHLORIDE 2 ML: 2.5 INJECTION, SOLUTION INFILTRATION; PERINEURAL at 10:58

## 2018-09-26 NOTE — LETTER
September 26, 2018     Patient: Malena Rosenbaum   YOB: 1965   Date of Visit: 9/26/2018       To Whom it May Concern:    Sagrario Sweeney is under my professional care  She was seen in my office on 9/26/2018  She may return to work on 09/27/2018  Patient should be able to utilize the brace that she has been provided today while at work  If you have any questions or concerns, please don't hesitate to call           Sincerely,          Guilford Nyhan, DO        CC: No Recipients

## 2018-09-26 NOTE — PROGRESS NOTES
Assessment:  1  Acute pain of right knee  Ambulatory referral to Orthopedic Surgery     Patient Active Problem List   Diagnosis    Abnormal computed tomography scan    Asthma    Esophageal reflux    Insomnia    Migraine, unspecified, not intractable, without status migrainosus    Obesity, Class II, BMI 35 0-39 9, with comorbidity (see actual BMI)    Venous insufficiency    Vitamin D deficiency           Plan      Physical therapy  Cortisone injection given today into the right knee   brace for the right knee  Diaz taping for bilateral knees            Subjective:     Patient ID:    Chief Complaint:Riana Jeronimo 47 y o  female      HPI    Patient comes in today with regards to Bilateral knee pain  Started out with just going up the steps but now it progressively has gotten worse to even minimal activities aggravated  The patient reports that the pain is in the  Ms  Anterior medial and lateral aspect  Sometimes the pain can be in the backside knee as well the superior aspect of the patella of the and has been going on for  1 year  The pain is rated at7 at its best and10 at its worst   The pain is described as  stabbing knife-like  It is worsened with  Stairs sitting and standing too long as well, and is made better with  massage  The patient has taken  Joint flex topical cream and Voltaren gel for treatment  No specific injury      The following portions of the patient's history were reviewed and updated as appropriate: allergies, current medications, past family history, past social history, past surgical history and problem list         Social History     Social History    Marital status: Single     Spouse name: N/A    Number of children: N/A    Years of education: N/A     Occupational History    Not on file  Social History Main Topics    Smoking status: Never Smoker    Smokeless tobacco: Never Used      Comment: former smoker quit 4 years ago    smoked for at least 20 years  Marsha Wesley half a pack to one pack of cigarettes a day    Alcohol use No    Drug use: No    Sexual activity: Not on file     Other Topics Concern    Not on file     Social History Narrative    No narrative on file     No past medical history on file  Past Surgical History:   Procedure Laterality Date    DILATION AND CURETTAGE OF UTERUS       Allergies   Allergen Reactions    Seasonal Ic  [Cholestatin]     Sulfamethoxazole-Trimethoprim      Other reaction(s): yeast infection  Annotation - [id number]: reaction is rash     Current Outpatient Prescriptions on File Prior to Visit   Medication Sig Dispense Refill    albuterol (VENTOLIN HFA) 90 mcg/act inhaler Inhale 1-2 puffs every 6 (six) hours as needed      ALPRAZolam (XANAX) 0 5 mg tablet Take 1 tablet (0 5 mg total) by mouth daily at bedtime as needed for anxiety 30 tablet 0    benzonatate (TESSALON) 200 MG capsule Take 1 capsule (200 mg total) by mouth 3 (three) times a day as needed for cough 20 capsule 0    Cholecalciferol (VITAMIN D3) 2000 units capsule Take by mouth      diclofenac sodium (VOLTAREN) 1 % Place on the skin      escitalopram (LEXAPRO) 20 mg tablet Take 1 tablet (20 mg total) by mouth daily 90 tablet 0    fluticasone (FLONASE) 50 mcg/act nasal spray 2 sprays into each nostril daily      fluticasone (FLONASE) 50 mcg/act nasal spray 1 spray into each nostril daily 16 g 0    fluticasone (FLOVENT HFA) 110 MCG/ACT inhaler Inhale 1 puff 2 (two) times a day 1 Inhaler 2    hydrochlorothiazide (MICROZIDE) 12 5 mg capsule Take 1 capsule (12 5 mg total) by mouth every morning 90 capsule 0    hydrOXYzine HCL (ATARAX) 50 mg tablet Take 1 tablet (50 mg total) by mouth every 8 (eight) hours as needed for itching 30 tablet 0    pantoprazole (PROTONIX) 40 mg tablet Take 1 tablet by mouth daily      topiramate (TOPAMAX) 50 MG tablet Take 1 tablet by mouth every 12 (twelve) hours       No current facility-administered medications on file prior to visit  Objective:    Review of Systems   Constitutional: Positive for unexpected weight change  HENT: Negative  Eyes: Negative  Respiratory: Positive for shortness of breath  Cardiovascular: Positive for leg swelling  Gastrointestinal: Negative  Endocrine: Positive for polyuria  Genitourinary: Negative  Musculoskeletal:        See HPI   Skin: Negative  Allergic/Immunologic: Negative  Neurological: Positive for numbness and headaches  Hematological: Negative  Psychiatric/Behavioral: The patient is nervous/anxious  Right Knee Exam     Tenderness   The patient is experiencing tenderness in the lateral joint line, medial retinaculum, medial joint line, pes anserinus and patella  Range of Motion   The patient has normal right knee ROM  Muscle Strength     The patient has normal right knee strength  Tests   Luis:  Medial - positive Lateral - positive    Valgus: positive    Other   Erythema: absent  Scars: absent  Sensation: normal  Pulse: present  Swelling: none  Other tests: no effusion present    Comments:  Tenderness is diffuse around the knee both anterior medial anterior lateral posterior medial posterior lateral lateral patella facet medial patella medial patellofemoral ligament patellar grind test patellar tendon quad tendon Luis's test positive but not sure if this is just because of the the sensitivity of the patient or there is actually a tear  Left Knee Exam     Range of Motion   The patient has normal left knee ROM  Muscle Strength     The patient has normal left knee strength  Other   Erythema: absent  Sensation: normal            Physical Exam   Constitutional: She is oriented to person, place, and time  She appears well-developed  Obese   HENT:   Head: Normocephalic  Eyes: Pupils are equal, round, and reactive to light  Neck: Normal range of motion  Cardiovascular: Normal rate      Pulmonary/Chest: Effort normal    Abdominal: She exhibits no distension  Musculoskeletal: She exhibits no deformity  Right knee: She exhibits no effusion  Neurological: She is alert and oriented to person, place, and time  Skin: Skin is warm  Psychiatric: She has a normal mood and affect  Nursing note and vitals reviewed  Large joint arthrocentesis  Date/Time: 9/26/2018 10:58 AM  Consent given by: patient  Supporting Documentation  Indications: pain   Procedure Details  Location: knee - R knee  Needle size: 22 G  Ultrasound guidance: no  Approach: anterolateral  Medications administered: 6 mg betamethasone acetate-betamethasone sodium phosphate 6 (3-3) mg/mL; 2 mL bupivacaine 0 25 %               I have personally reviewed pertinent films in PACS and my interpretation is X-ray show medial joint space narrowing a lateral tracking and tilting of the patella    minimal arthritis in the medial joint space of the left knee otherwise normal tracking of the patella as well      Portions of the record may have been created with voice recognition software   Occasional wrong word or "sound a like" substitutions may have occurred due to the inherent limitations of voice recognition software   Read the chart carefully and recognize, using context, where substitutions have occurred

## 2018-10-04 ENCOUNTER — EVALUATION (OUTPATIENT)
Dept: PHYSICAL THERAPY | Facility: CLINIC | Age: 53
End: 2018-10-04
Payer: COMMERCIAL

## 2018-10-04 DIAGNOSIS — M17.0 ARTHRITIS OF BOTH KNEES: Primary | ICD-10-CM

## 2018-10-04 DIAGNOSIS — M22.2X1 PATELLOFEMORAL SYNDROME OF BOTH KNEES: ICD-10-CM

## 2018-10-04 DIAGNOSIS — M25.562 LEFT KNEE PAIN, UNSPECIFIED CHRONICITY: ICD-10-CM

## 2018-10-04 DIAGNOSIS — M22.2X2 PATELLOFEMORAL SYNDROME OF BOTH KNEES: ICD-10-CM

## 2018-10-04 PROCEDURE — G8978 MOBILITY CURRENT STATUS: HCPCS | Performed by: PHYSICAL THERAPIST

## 2018-10-04 PROCEDURE — 97162 PT EVAL MOD COMPLEX 30 MIN: CPT | Performed by: PHYSICAL THERAPIST

## 2018-10-04 PROCEDURE — G8979 MOBILITY GOAL STATUS: HCPCS | Performed by: PHYSICAL THERAPIST

## 2018-10-04 PROCEDURE — 97110 THERAPEUTIC EXERCISES: CPT | Performed by: PHYSICAL THERAPIST

## 2018-10-04 NOTE — PROGRESS NOTES
PT Evaluation     Today's date: 10/4/2018  Patient name: Alesha Dee  : 1965  MRN: [id number]  Referring provider: Juany Gaines DO  Dx:   Encounter Diagnosis     ICD-10-CM    1  Arthritis of both knees M17 0 Ambulatory referral to Physical Therapy   2  Patellofemoral syndrome of both knees M22 2X1 Ambulatory referral to Physical Therapy    M22 2X2    3  Left knee pain, unspecified chronicity M25 562 Ambulatory referral to Physical Therapy                  Assessment  Impairments: abnormal gait, activity intolerance, impaired balance, impaired physical strength, lacks appropriate home exercise program, pain with function and poor posture     Assessment details: Pt presents with signs and symptoms synonymous of admitting diagnosis  Pt presents with pain, decreased strength, decreased range, flexibility, decreased trunk strength as well as tolerance to activity and gait dysfunction  Pt would benefit skilled PT intervention in order to address these impairments in order to be able to perform all desired activities with minimal to nil symptom exacerbation  If she does not find improvement over the next 4-8 weeks she may benefit re-consulation    Thank you very much for this referral      Understanding of Dx/Px/POC: good   Prognosis: good    Goals  STG 4 Weeks:  Decrease pain at worst to 6/10  Improve range to SLR to 60  Improve strength to 4-/5 hip  Independent with HEP  LTG 8 Weeks:  Decrease pain at worst to 3/10  Improve range to 65  Improve strength to 4-/5 hip  Able to perform all desired activities with minimal to nil symptom exacerbation      Plan  Patient would benefit from: skilled physical therapy  Planned modality interventions: cryotherapy and thermotherapy: hydrocollator packs  Planned therapy interventions: abdominal trunk stabilization, joint mobilization, manual therapy, neuromuscular re-education, patient education, postural training, home exercise program, graded motor, graded exercise, graded activity, gait training, functional ROM exercises, flexibility, therapeutic exercise, therapeutic activities, stretching and strengthening  Frequency: 2x week  Duration in weeks: 8  Treatment plan discussed with: patient        Subjective Evaluation    History of Present Illness  Date of onset: 10/4/2018  Mechanism of injury: Pt is a 47 yofemale who presents today stating that she developed an insidious onset of knee pain that is R > L knee and foot pain  Pt reports that she it initially began with elevations and then eventually she has locking moments after sitting for a while that takes a little while before she gets warmed up  Pt reports there wasn't periods of time without pain but at worst it would get up to a 10/10  Pt reports that she finally chose to visit with her PCP who ordered X-ray and then referred to Dr Zeina Caraballo   Pt reports that he assess with PFD as well as OA  Pt reports that she also received an injection which has demonstrated assistance within her knees and referred to PT  Pt reports that her goals are to be able stay mobile, active and help keep her pain levels down as she would like to be more active in her community as well as 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  Pt states that she follows up with Dr Zeina Caraballo in 6 weeks  Pt reports that she will also be seeing Dr Tyler Tinoco in regards of her foot pain  Pt denies numbness or tingling into her feet at this time but occasionally will have it in her L foot     Pain  Current pain ratin  At best pain ratin  At worst pain rating: 10  Quality: dull ache, needle-like, sharp and radiating  Relieving factors: change in position and medications  Aggravating factors: standing, walking and stair climbing  Progression: worsening      Diagnostic Tests  X-ray: abnormal (OA)  Treatments  Previous treatment: injection treatment  Patient Goals  Patient goals for therapy: decreased pain, improved balance, increased motion, increased strength and return to sport/leisure activities          Objective     Active Range of Motion   Left Knee   Flexion: 125 degrees   Extension: 0 degrees     Additional Active Range of Motion Details  Sensation intact to light touch L3,4,5,S1,S2  Antalgia with R > L IC  Genu Valgus L > R  SLS L 5" R 1"  Hip strength  L Flex 3+ Ext 4 Abd 3 Add 4-  R Flex Ext Abd Add  Knee Strength  L 5/5 flex ext  R 5/5 flex ext  Quad Set L good R fair  Palpation: Mild pain along medial joint line  Joint Mobility  L AP PA ML LM Grade 2, Patellar Gross 4  R AP PA ML LM Grade 2, Patellar  STs:   L + Luis, (-) Lochman, (-) Ant/Post Drawer, (-) Valgus/Varus, + Patellar Scour, SLR 55  R + Valgus, (-) Varus, + SLR 55, (-) Ant/Post Drawer, + Luis,   TA draw 8" before form failure           Flowsheet Rows      Most Recent Value   PT/OT G-Codes   Current Score  24   Projected Score  47          Precautions: Asthma, Vit D deficiency    Daily Treatment Diary       Manual 10/4            AP PA Tib on Femur R             PROM flex R knee                                                    Exercise Diary             Bike Trial             Quad Set  6" x 10            SLR Flex 2 x 10            TA Draw + Bridge  3" 2 x 5            Ham ST with Strap B 20" x 4            Seated Gastroc ST with Strap             HR/TR trial UE support             LAQ B             Clam Shell Supine  RTB           3 way hip  RTB                                                                                                                                                          Modalities             CP to B knee

## 2018-10-08 ENCOUNTER — OFFICE VISIT (OUTPATIENT)
Dept: PHYSICAL THERAPY | Facility: CLINIC | Age: 53
End: 2018-10-08
Payer: COMMERCIAL

## 2018-10-08 DIAGNOSIS — M22.2X1 PATELLOFEMORAL SYNDROME OF BOTH KNEES: ICD-10-CM

## 2018-10-08 DIAGNOSIS — M25.562 LEFT KNEE PAIN, UNSPECIFIED CHRONICITY: ICD-10-CM

## 2018-10-08 DIAGNOSIS — M17.0 ARTHRITIS OF BOTH KNEES: Primary | ICD-10-CM

## 2018-10-08 DIAGNOSIS — M22.2X2 PATELLOFEMORAL SYNDROME OF BOTH KNEES: ICD-10-CM

## 2018-10-08 PROCEDURE — 97140 MANUAL THERAPY 1/> REGIONS: CPT | Performed by: PHYSICAL THERAPIST

## 2018-10-08 PROCEDURE — 97110 THERAPEUTIC EXERCISES: CPT | Performed by: PHYSICAL THERAPIST

## 2018-10-08 NOTE — PROGRESS NOTES
Daily Note     Today's date: 10/8/2018  Patient name: Francies Homans  : 1965  MRN: [id number]  Referring provider: José Luis Baker DO  Dx:   Encounter Diagnosis     ICD-10-CM    1  Arthritis of both knees M17 0    2  Patellofemoral syndrome of both knees M22 2X1     M22 2X2    3  Left knee pain, unspecified chronicity M25 562                   Subjective: Pt presents today stating that she had been sore and tired from her new HEP but the knees held up well  Objective: See treatment diary below      Assessment:  Challenged with Bike positioning on her LS, possible consideration to TM n v   Proceeded with outlined activities and fair recall of HEP, minor cues for counting, no pain t/o        Precautions: Asthma, Vit D deficiency     Daily Treatment Diary         Manual 10/4  10                   AP PA Tib on Femur R    6 min                   PROM flex R knee    4 min                                                                                           Exercise Diary                       Bike Trial    6 min  TM this visit                 Quad Set  6" x 10  6" x10                   SLR Flex 2 x 10  2x10                   TA Draw + Bridge  3" 2 x 5  3" x 10                   Ham ST with Strap B 20" x 4  20" x 4                   Seated Gastroc ST with Strap    20" x 4                   HR/TR trial UE support    2  X 10                   LAQ B    2# 2 x 10                   Clam Shell Supine   RTB 2 x 10                   3 way hip   RTB nv                                                                                                                                                                                                                                                                                           Modalities                       CP to B knee    10 min

## 2018-10-12 ENCOUNTER — OFFICE VISIT (OUTPATIENT)
Dept: PHYSICAL THERAPY | Facility: CLINIC | Age: 53
End: 2018-10-12
Payer: COMMERCIAL

## 2018-10-12 DIAGNOSIS — M22.2X1 PATELLOFEMORAL SYNDROME OF BOTH KNEES: Primary | ICD-10-CM

## 2018-10-12 DIAGNOSIS — M22.2X2 PATELLOFEMORAL SYNDROME OF BOTH KNEES: Primary | ICD-10-CM

## 2018-10-12 DIAGNOSIS — M25.562 LEFT KNEE PAIN, UNSPECIFIED CHRONICITY: ICD-10-CM

## 2018-10-12 DIAGNOSIS — M17.0 ARTHRITIS OF BOTH KNEES: ICD-10-CM

## 2018-10-12 PROCEDURE — 97110 THERAPEUTIC EXERCISES: CPT | Performed by: PHYSICAL THERAPIST

## 2018-10-12 PROCEDURE — 97140 MANUAL THERAPY 1/> REGIONS: CPT | Performed by: PHYSICAL THERAPIST

## 2018-10-12 NOTE — PROGRESS NOTES
Daily Note     Today's date: 10/12/2018  Patient name: Syeda Butterfield  : 1965  MRN: [id number]  Referring provider: Ingrid Rosas DO  Dx:   Encounter Diagnosis     ICD-10-CM    1  Patellofemoral syndrome of both knees M22 2X1     M22 2X2    2  Arthritis of both knees M17 0    3  Left knee pain, unspecified chronicity M25 562                   Subjective: Pt presents today stating that her knees feel pretty good, her back is a little sore  Objective: See treatment diary below      Assessment:  Started with TM today without symptom exacerbation as biking was too challenging last visit  Proceeded with CKC activities without symptom exacerbation       Precautions: Asthma, Vit D deficiency     Daily Treatment Diary         Manual 10/4  10/8  10/12                 AP PA Tib on Femur R    6 min  6 min                 PROM flex R knee    4 min  4 min                                                                                         Exercise Diary                       Bike Trial    6 min  TM 6 mins                 Quad Set  6" x 10  6" x10  6" x 10                 SLR Flex 2 x 10  2x10  2 x 10                 TA Draw + Bridge  3" 2 x 5  3" x 10  3" x 10                 Ham ST with Strap B 20" x 4  20" x 4  20" x 4                 Seated Gastroc ST with Strap    20" x 4  20" x 4                 HR/TR trial UE support    2  X 10  2 x 10                 LAQ B    2# 2 x 10  2# 2 x 10                 Clam Shell Supine   RTB 2 x 10  GTB 2 x 10                 3 way hip   nv  2 x 10                  sit to stand      nv                                                                                                                                                                                                                                                                 Modalities                       CP to B knee    10 min  10 min

## 2018-10-15 ENCOUNTER — APPOINTMENT (OUTPATIENT)
Dept: PHYSICAL THERAPY | Facility: CLINIC | Age: 53
End: 2018-10-15
Payer: COMMERCIAL

## 2018-10-16 ENCOUNTER — OFFICE VISIT (OUTPATIENT)
Dept: OBGYN CLINIC | Facility: CLINIC | Age: 53
End: 2018-10-16
Payer: COMMERCIAL

## 2018-10-16 ENCOUNTER — APPOINTMENT (OUTPATIENT)
Dept: RADIOLOGY | Facility: CLINIC | Age: 53
End: 2018-10-16
Payer: COMMERCIAL

## 2018-10-16 VITALS
HEIGHT: 63 IN | HEART RATE: 89 BPM | WEIGHT: 240 LBS | DIASTOLIC BLOOD PRESSURE: 81 MMHG | BODY MASS INDEX: 42.52 KG/M2 | SYSTOLIC BLOOD PRESSURE: 116 MMHG

## 2018-10-16 DIAGNOSIS — L90.9 FAT PAD ATROPHY OF FOOT: ICD-10-CM

## 2018-10-16 DIAGNOSIS — M79.671 PAIN IN RIGHT FOOT: ICD-10-CM

## 2018-10-16 DIAGNOSIS — M79.671 PAIN IN RIGHT FOOT: Primary | ICD-10-CM

## 2018-10-16 DIAGNOSIS — M79.672 PAIN IN LEFT FOOT: ICD-10-CM

## 2018-10-16 DIAGNOSIS — M19.079 ARTHRITIS OF MIDFOOT: ICD-10-CM

## 2018-10-16 PROCEDURE — 99213 OFFICE O/P EST LOW 20 MIN: CPT | Performed by: ORTHOPAEDIC SURGERY

## 2018-10-16 PROCEDURE — 73630 X-RAY EXAM OF FOOT: CPT

## 2018-10-16 NOTE — PROGRESS NOTES
JUSTINO Sheikh  Attending, Orthopaedic Surgery  [facility] [address] [facility]      ORTHOPAEDIC FOOT AND ANKLE CLINIC VISIT       Assessment:     Encounter Diagnoses   Name Primary?  Pain in left foot     Pain in right foot Yes    Arthritis of midfoot     Fat pad atrophy of foot             Plan:   · The patient verbalized understanding of exam findings and treatment plan  We engaged in the shared decision-making process and treatment options were discussed at length with the patient  · Patient has midfoot degeneration in right 2nd and 3rd TMT joints as well as plantar heel pain in the left  · We recommended orthotics for midfoot support bilaterally as well as arch support and heel padding for the left  · If the orthotics do not help her, she would be a candidate for injection into midfoot arthritic joints  Return in about 10 weeks (around 12/25/2018)  History of Present Illness:   Chief Complaint:   Chief Complaint   Patient presents with    Left Foot - Pain    Right Foot - Pain       Alesha Dee is a 47 y o  female who is being seen for bilateral foot pain for the past 6 years  She saw a podiatrist who was injecting her left heel for plantar fasciitis  She has developed dorsal midfoot pain bilaterally R>L  Pain is localized at 2nd and 3rd TMT joints bilaterally as well as plantar dorsal heel with minimal radiating and described as sharp and severe  Patient denies numbness, tingling or radicular pain  Denies history of neuropathy  Patient does not smoke, does not have diabetes and does not take blood thinners  Patient denies family history of anesthesia complications and has not had any complications with anesthesia       Pain/symptom timing:  Worse during the day when active  Pain/symptom context:  Worse with activites and work  Pain/symptom modifying factors:  Rest makes better, activities make worse  Pain/symptom associated signs/symptoms: none    Prior treatment   · NSAIDsYes    · Injections Yes   · Bracing/Orthotics No    · Physical Therapy No     Orthopedic Surgical History:   See above    Past Medical History:  History reviewed  No pertinent past medical history      Past Surgical History:   Procedure Laterality Date    DILATION AND CURETTAGE OF UTERUS         The patient has a family history of        Current Outpatient Prescriptions:     albuterol (VENTOLIN HFA) 90 mcg/act inhaler, Inhale 1-2 puffs every 6 (six) hours as needed, Disp: , Rfl:     ALPRAZolam (XANAX) 0 5 mg tablet, Take 1 tablet (0 5 mg total) by mouth daily at bedtime as needed for anxiety, Disp: 30 tablet, Rfl: 0    benzonatate (TESSALON) 200 MG capsule, Take 1 capsule (200 mg total) by mouth 3 (three) times a day as needed for cough, Disp: 20 capsule, Rfl: 0    Cholecalciferol (VITAMIN D3) 2000 units capsule, Take by mouth, Disp: , Rfl:     diclofenac sodium (VOLTAREN) 1 %, Place on the skin, Disp: , Rfl:     escitalopram (LEXAPRO) 20 mg tablet, Take 1 tablet (20 mg total) by mouth daily, Disp: 90 tablet, Rfl: 0    fluticasone (FLONASE) 50 mcg/act nasal spray, 2 sprays into each nostril daily, Disp: , Rfl:     fluticasone (FLONASE) 50 mcg/act nasal spray, 1 spray into each nostril daily, Disp: 16 g, Rfl: 0    fluticasone (FLOVENT HFA) 110 MCG/ACT inhaler, Inhale 1 puff 2 (two) times a day, Disp: 1 Inhaler, Rfl: 2    hydrochlorothiazide (MICROZIDE) 12 5 mg capsule, Take 1 capsule (12 5 mg total) by mouth every morning, Disp: 90 capsule, Rfl: 0    hydrOXYzine HCL (ATARAX) 50 mg tablet, Take 1 tablet (50 mg total) by mouth every 8 (eight) hours as needed for itching, Disp: 30 tablet, Rfl: 0    meloxicam (MOBIC) 7 5 mg tablet, Take 1 tablet (7 5 mg total) by mouth daily, Disp: 60 tablet, Rfl: 0    pantoprazole (PROTONIX) 40 mg tablet, Take 1 tablet by mouth daily, Disp: , Rfl:     topiramate (TOPAMAX) 50 MG tablet, Take 1 tablet by mouth every 12 (twelve) hours, Disp: , Rfl:       Allergies   Allergen Reactions    Seasonal Ic  [Cholestatin]     Sulfamethoxazole-Trimethoprim      Other reaction(s): yeast infection  Annotation - [id number]: reaction is rash       Review of Systems:  A comprehensive 14 point ROS was performed, reviewed, and the pertinent orthopaedic findings are documented in the HPI  Physical Exam:   /81 (BP Location: Right arm, Patient Position: Sitting, Cuff Size: Adult)   Pulse 89   Ht 5' 3" (1 6 m)   Wt 109 kg (240 lb)   BMI 42 51 kg/m²   General/Constitutional: No apparent distress: well-nourished and well developed  Eyes: normal ocular motion  Lymphatic: No appreciable lymphadenopathy  Respiratory: Non-labored breathing  Vascular: No edema, swelling or tenderness, except as noted in detailed exam   Integumentary: No impressive skin lesions present, except as noted in detailed exam   Neuro: No ataxia or tremors noted  Psych: Normal mood and affect, oriented to person, place and time  Appropriate affect  Musculoskeletal: Normal, except as noted in detailed exam and in HPI  Examination      Right Left   Gait Normal   Musculoskeletal Tender to palpation at dorsal 2nd and 3rd TMT Tender to palpation at plantar heel peripherally   Skin Normal    Normal      Nails Normal Normal   Range of Motion  10 degrees dorsiflexion, 30 degrees plantarflexion  Subtalar motion: normal 10 degrees dorsiflexion, 30 degrees plantarflexion  Subtalar motion: normal   Stability Stable Stable   Muscle Strength 5/5 tibialis anterior  5/5 gastrocnemius-soleus  5/5 posterior tibialis  5/5 peroneal/eversion strength  5/5 EHL  5/5 FHL 5/5 tibialis anterior  5/5 gastrocnemius-soleus  5/5 posterior tibialis  5/5 peroneal/eversion strength  5/5 EHL  5/5 FHL   Neurologic Normal Normal   Sensation Intact to light touch throughout sural, saphenous, superficial peroneal, deep peroneal and medial/lateral plantar nerve distributions    [place] 5 07 filament (10g) testing deferred  Intact to light touch throughout sural, saphenous, superficial peroneal, deep peroneal and medial/lateral plantar nerve distributions  [place]ra 5 07 filament (10g) testing deferred  Cardiovascular Brisk capillary refill < 2 seconds,intact DP and PT pulses Brisk capillary refill < 2 seconds,intact DP and PT pulses   Special Tests None None       Imaging Studies:   3 views of the left foot were taken, reviewed and interpreted independently that demonstrate insertional achilles enthysophte with mild DJD at midfoot  Reviewed by me personally    3 Views of the right foot were taken, reviewed and interpreted independently which demonstrated joint space narrowing at 3rd TMT with small osteophyte production  Reviewed by me personally  Rockwell Fireman Lachman, MD  Attending, [facility]      PREETI personally performed the service  Rockwell Fireman Lachman, MD

## 2018-10-19 ENCOUNTER — OFFICE VISIT (OUTPATIENT)
Dept: PHYSICAL THERAPY | Facility: CLINIC | Age: 53
End: 2018-10-19
Payer: COMMERCIAL

## 2018-10-19 DIAGNOSIS — M25.562 LEFT KNEE PAIN, UNSPECIFIED CHRONICITY: ICD-10-CM

## 2018-10-19 DIAGNOSIS — M17.0 ARTHRITIS OF BOTH KNEES: ICD-10-CM

## 2018-10-19 DIAGNOSIS — M22.2X2 PATELLOFEMORAL SYNDROME OF BOTH KNEES: Primary | ICD-10-CM

## 2018-10-19 DIAGNOSIS — M22.2X1 PATELLOFEMORAL SYNDROME OF BOTH KNEES: Primary | ICD-10-CM

## 2018-10-19 PROCEDURE — 97140 MANUAL THERAPY 1/> REGIONS: CPT | Performed by: PHYSICAL THERAPIST

## 2018-10-19 PROCEDURE — 97110 THERAPEUTIC EXERCISES: CPT | Performed by: PHYSICAL THERAPIST

## 2018-10-19 NOTE — PROGRESS NOTES
Daily Note     Today's date: 10/19/2018  Patient name: Aleksey Hoskins  : 1965  MRN: [id number]  Referring provider: Erika Inman DO  Dx:   Encounter Diagnosis     ICD-10-CM    1  Patellofemoral syndrome of both knees M22 2X1     M22 2X2    2  Arthritis of both knees M17 0    3  Left knee pain, unspecified chronicity M25 562                   Subjective: Pt presents today stating that as a whole she has been feeling much better since beginning PT however the inside of her knee is a little irritable  Objective: See treatment diary below      Assessment:  Held sit to stands as there was slight soreness and fatigue as well as pt requested to hold  Reviewed TA contraction to prevent LS pain, pt was Anteriorly rotating possibly exacerbating facet closure, sustained neutral pelvis, pt without LS symptoms  Continue to progress as able      Precautions: Asthma, Vit D deficiency     Daily Treatment Diary         Manual 10/4  10/8  10/12  10/19               AP PA Tib on Femur R    6 min  6 min  6 min               PROM flex R knee    4 min  4 min  4 min                                                                                       Exercise Diary                       Bike Trial    6 min  TM 6 mins  TM6               Quad Set  6" x 10  6" x10  6" x 10  6" x10               SLR Flex 2 x 10  2x10  2 x 10  2x10               TA Draw + Bridge  3" 2 x 5  3" x 10  3" x 10  3 x 10               Ham ST with Strap B 20" x 4  20" x 4  20" x 4  20" x 4               Seated Gastroc ST with Strap    20" x 4  20" x 4  Wedge 30" x 4               HR/TR trial UE support    2  X 10  2 x 10  2 x 10               LAQ B    2# 2 x 10  2# 2 x 10  2 5#x20               Clam Shell Supine   RTB 2 x 10  GTB 2 x 10  GTB 2 x 10               3 way hip   nv  2 x 10  2 x 10                sit to stand      nv  nv                                                                                                                                                                                                                                                               Modalities                       CP to B knee    10 min  10 min  10 mins

## 2018-10-22 ENCOUNTER — APPOINTMENT (OUTPATIENT)
Dept: PHYSICAL THERAPY | Facility: CLINIC | Age: 53
End: 2018-10-22
Payer: COMMERCIAL

## 2018-10-26 ENCOUNTER — OFFICE VISIT (OUTPATIENT)
Dept: PHYSICAL THERAPY | Facility: CLINIC | Age: 53
End: 2018-10-26
Payer: COMMERCIAL

## 2018-10-26 DIAGNOSIS — M22.2X2 PATELLOFEMORAL SYNDROME OF BOTH KNEES: Primary | ICD-10-CM

## 2018-10-26 DIAGNOSIS — M17.0 ARTHRITIS OF BOTH KNEES: ICD-10-CM

## 2018-10-26 DIAGNOSIS — M22.2X1 PATELLOFEMORAL SYNDROME OF BOTH KNEES: Primary | ICD-10-CM

## 2018-10-26 DIAGNOSIS — M25.562 LEFT KNEE PAIN, UNSPECIFIED CHRONICITY: ICD-10-CM

## 2018-10-26 PROCEDURE — 97110 THERAPEUTIC EXERCISES: CPT

## 2018-10-26 PROCEDURE — 97112 NEUROMUSCULAR REEDUCATION: CPT

## 2018-10-26 PROCEDURE — 97140 MANUAL THERAPY 1/> REGIONS: CPT

## 2018-10-26 NOTE — PROGRESS NOTES
Daily Note     Today's date: 10/26/2018  Patient name: Tree Red  : 1965  MRN: [id number]  Referring provider: Maricarmen Gomez DO  Dx:   Encounter Diagnosis     ICD-10-CM    1  Patellofemoral syndrome of both knees M22 2X1     M22 2X2    2  Arthritis of both knees M17 0    3  Left knee pain, unspecified chronicity M25 562        Start Time: 830  Stop Time: 930  Total time in clinic (min): 60 minutes    Subjective: Pt states that she thinks she has an MD appt on Monday for her knee  Pt has "a little bit of pain in my right knee but it is my foot that really bothers me"    Objective: See treatment diary below      Assessment:  Pt tolerated session fair  Pt did have soreness post treatment, but no pain reported during treatment  Pt has most difficulty this session with TA contraction and bridges, VC and TC given for proper form       Precautions: Asthma, Vit D deficiency     Daily Treatment Diary         Manual 10/4  10/8  10/12  10/19  10/26             AP PA Tib on Femur R    6 min  6 min  6 min  TS             PROM flex R knee    4 min  4 min  4 min  LK                                                                                     Exercise Diary                       Bike Trial    6 min  TM 6 mins  TM6  TM 6             Quad Set  6" x 10  6" x10  6" x 10  6" x10  10"x10             SLR Flex 2 x 10  2x10  2 x 10  2x10  2x10             TA Draw + Bridge  3" 2 x 5  3" x 10  3" x 10  3 x 10  2x10 3"              Ham ST with Strap B 20" x 4  20" x 4  20" x 4  20" x 4  20"x4             Seated Gastroc ST with Strap    20" x 4  20" x 4  Wedge 30" x 4  wedge 30"x4             HR/TR trial UE support    2  X 10  2 x 10  2 x 10  2x10             LAQ B    2# 2 x 10  2# 2 x 10  2 5#x20  3# 20x              Clam Shell Supine   RTB 2 x 10  GTB 2 x 10  GTB 2 x 10  GTB 20x  BTB           3 way hip   nv  2 x 10  2 x 10  2x10              sit to stand      nv  nv  2x10                                                                                                                                                                                                                                                             Modalities                       CP to B knee    10 min  10 min  10 mins  defered

## 2018-10-29 ENCOUNTER — OFFICE VISIT (OUTPATIENT)
Dept: PHYSICAL THERAPY | Facility: CLINIC | Age: 53
End: 2018-10-29
Payer: COMMERCIAL

## 2018-10-29 DIAGNOSIS — M25.562 LEFT KNEE PAIN, UNSPECIFIED CHRONICITY: ICD-10-CM

## 2018-10-29 DIAGNOSIS — M22.2X2 PATELLOFEMORAL SYNDROME OF BOTH KNEES: Primary | ICD-10-CM

## 2018-10-29 DIAGNOSIS — M17.0 ARTHRITIS OF BOTH KNEES: ICD-10-CM

## 2018-10-29 DIAGNOSIS — M22.2X1 PATELLOFEMORAL SYNDROME OF BOTH KNEES: Primary | ICD-10-CM

## 2018-10-29 PROCEDURE — 97140 MANUAL THERAPY 1/> REGIONS: CPT | Performed by: PHYSICAL THERAPIST

## 2018-10-29 PROCEDURE — 97110 THERAPEUTIC EXERCISES: CPT | Performed by: PHYSICAL THERAPIST

## 2018-10-29 NOTE — PROGRESS NOTES
Daily Note     Today's date: 10/29/2018  Patient name: Siddhartha Arteaga  : 1965  MRN: [id number]  Referring provider: Ivelisse Becker DO  Dx:   Encounter Diagnosis     ICD-10-CM    1  Patellofemoral syndrome of both knees M22 2X1     M22 2X2    2  Arthritis of both knees M17 0    3  Left knee pain, unspecified chronicity M25 562                   Subjective: Pt reports that she is very content with her progression  She states that she rarely has knee pain and she is in more or less having a lot of Foot pain as a whole which she has been seeking treatment with  Objective: See treatment diary below      Assessment:  Progressing very well in [facility] and [facility] activities    Continue to progress as able, RE nv       Precautions: Asthma, Vit D deficiency     Daily Treatment Diary         Manual 10/4  10/8  10/12  10/19  10/26  10/29           AP PA Tib on Femur R    6 min  6 min  6 min  TS  8           PROM flex R knee    4 min  4 min  4 min  LK  10                                                                                   Exercise Diary                       Bike Trial    6 min  TM 6 mins  TM6  TM 6  TM 6           Quad Set  6" x 10  6" x10  6" x 10  6" x10  10"x10  HEP           SLR Flex 2 x 10  2x10  2 x 10  2x10  2x10  3 x10           TA Draw + Bridge  3" 2 x 5  3" x 10  3" x 10  3 x 10  2x10 3"   2 x 10 3"           Ham ST with Strap B 20" x 4  20" x 4  20" x 4  20" x 4  20"x4  20" x 4           Seated Gastroc ST with Strap    20" x 4  20" x 4  Wedge 30" x 4  wedge 30"x4  Wedge 30" x 4           HR/TR trial UE support    2  X 10  2 x 10  2 x 10  2x10  3 x 10           LAQ B    2# 2 x 10  2# 2 x 10  2 5#x20  3# 20x   4# 2 x 10           Clam Shell Supine   RTB 2 x 10  GTB 2 x 10  GTB 2 x 10  GTB 20x  BTB20x           3 way hip   nv  2 x 10  2 x 10  2x10  2 x 10            sit to stand      nv  nv  2x10  2 x 10            SLS B           10" x10                                                                                                                                                                                                                                    Modalities                       CP to B knee    10 min  10 min  10 mins  defered  declined

## 2018-11-02 ENCOUNTER — APPOINTMENT (OUTPATIENT)
Dept: PHYSICAL THERAPY | Facility: CLINIC | Age: 53
End: 2018-11-02
Payer: COMMERCIAL

## 2018-11-05 ENCOUNTER — OFFICE VISIT (OUTPATIENT)
Dept: OBGYN CLINIC | Facility: CLINIC | Age: 53
End: 2018-11-05
Payer: COMMERCIAL

## 2018-11-05 VITALS
HEART RATE: 94 BPM | HEIGHT: 63 IN | DIASTOLIC BLOOD PRESSURE: 81 MMHG | BODY MASS INDEX: 42.51 KG/M2 | SYSTOLIC BLOOD PRESSURE: 131 MMHG

## 2018-11-05 DIAGNOSIS — M22.2X1 PATELLOFEMORAL SYNDROME OF BOTH KNEES: Primary | ICD-10-CM

## 2018-11-05 DIAGNOSIS — M22.2X2 PATELLOFEMORAL SYNDROME OF BOTH KNEES: Primary | ICD-10-CM

## 2018-11-05 DIAGNOSIS — M17.0 ARTHRITIS OF BOTH KNEES: ICD-10-CM

## 2018-11-05 PROCEDURE — 99214 OFFICE O/P EST MOD 30 MIN: CPT | Performed by: ORTHOPAEDIC SURGERY

## 2018-11-05 NOTE — PROGRESS NOTES
Assessment/Plan:  1  Patellofemoral syndrome of both knees     2  Arthritis of both knees       Patient Active Problem List   Diagnosis    Abnormal computed tomography scan    Asthma    Esophageal reflux    Insomnia    Migraine, unspecified, not intractable, without status migrainosus    Obesity, Class II, BMI 35 0-39 9, with comorbidity (see actual BMI)    Venous insufficiency    Vitamin D deficiency    Patellofemoral syndrome of both knees    Arthritis of both knees       Discussion/Summary:    47 y o  female  With right knee pain secondary to patellofemoral pain syndrome  Patient reports that she has not yet tried the Daiz taping technique and she was uncertain about which tapes to by, as such a new handout was provided for and the specific tapes needed were explained to her in detail as well as the technique  She understands and will obtain the proper tapes from either our pharmacy or online, she will continue physical therapy who will demonstrate this technique for her  If continued physical therapy and Diaz taping do not improve her symptoms, she will call the office and at that time will consider obtaining prior authorization for Euflexxa injections for the right knee  Follow Up:  PRN    To Do Next Visit:    Possible Euflexxa injections right knee    Subjective:   Patient ID: Siddhartha Arteaga is a 47 y o  female   HPI    Patient presents to the office for follow up of  Right knee patellofemoral syndrome and osteoarthritis  Since the last visit, the patient reports  Symptoms  And pain have greatly improved, especially in the lateral knee  She reports she still has pain in the medial knee around the area of the MPFL  Which she points to  The says the pain is worse up and down stairs and prolonged activity, better with rest, corticosteroid injection the knee did make a great improvement  Patient  denies any new injuries to the  Right knee since the last visit       The following portions of the patient's history were reviewed and updated as appropriate: allergies, current medications, past family history, past social history, past surgical history and problem list     Social History     Social History    Marital status: Single     Spouse name: N/A    Number of children: N/A    Years of education: N/A     Occupational History    Not on file  Social History Main Topics    Smoking status: Never Smoker    Smokeless tobacco: Never Used      Comment: former smoker quit 4 years ago    smoked for at least 20 years    half a pack to one pack of cigarettes a day    Alcohol use No    Drug use: No    Sexual activity: Not on file     Other Topics Concern    Not on file     Social History Narrative    No narrative on file     No past medical history on file    Past Surgical History:   Procedure Laterality Date    DILATION AND CURETTAGE OF UTERUS       Allergies   Allergen Reactions    Seasonal Ic  [Cholestatin]     Sulfamethoxazole-Trimethoprim      Other reaction(s): yeast infection  [facility] - [id number]: reaction is rash     Current Outpatient Prescriptions on File Prior to Visit   Medication Sig Dispense Refill    albuterol (VENTOLIN HFA) 90 mcg/act inhaler Inhale 1-2 puffs every 6 (six) hours as needed      ALPRAZolam (XANAX) 0 5 mg tablet Take 1 tablet (0 5 mg total) by mouth daily at bedtime as needed for anxiety 30 tablet 0    benzonatate (TESSALON) 200 MG capsule Take 1 capsule (200 mg total) by mouth 3 (three) times a day as needed for cough 20 capsule 0    Cholecalciferol (VITAMIN D3) 2000 units capsule Take by mouth      diclofenac sodium (VOLTAREN) 1 % Place on the skin      escitalopram (LEXAPRO) 20 mg tablet Take 1 tablet (20 mg total) by mouth daily 90 tablet 0    fluticasone (FLONASE) 50 mcg/act nasal spray 2 sprays into each nostril daily      fluticasone (FLONASE) 50 mcg/act nasal spray 1 spray into each nostril daily 16 g 0    fluticasone (FLOVENT HFA) 110 MCG/ACT inhaler Inhale 1 puff 2 (two) times a day 1 Inhaler 2    hydrochlorothiazide (MICROZIDE) 12 5 mg capsule Take 1 capsule (12 5 mg total) by mouth every morning 90 capsule 0    hydrOXYzine HCL (ATARAX) 50 mg tablet Take 1 tablet (50 mg total) by mouth every 8 (eight) hours as needed for itching 30 tablet 0    meloxicam (MOBIC) 7 5 mg tablet Take 1 tablet (7 5 mg total) by mouth daily 60 tablet 0    pantoprazole (PROTONIX) 40 mg tablet Take 1 tablet by mouth daily      topiramate (TOPAMAX) 50 MG tablet Take 1 tablet by mouth every 12 (twelve) hours       No current facility-administered medications on file prior to visit  Review of Systems - Negative except as noted in HPI        Objective:    Vitals:    11/05/18 1029   BP: 131/81   Pulse: 94       Physical Exam   Constitutional: She is oriented to person, place, and time  She appears well-developed and well-nourished  HENT:   Head: Normocephalic and atraumatic  Eyes: Conjunctivae are normal  No scleral icterus  Neck: Neck supple  Pulmonary/Chest: Effort normal  No stridor  No respiratory distress  Abdominal: She exhibits no distension  Musculoskeletal:        Right knee: She exhibits no effusion  Neurological: She is alert and oriented to person, place, and time  Skin: Skin is warm and dry  No erythema  Psychiatric: She has a normal mood and affect  Her behavior is normal        Right Knee Exam     Tenderness   The patient is experiencing tenderness in the medial retinaculum  Range of Motion   The patient has normal right knee ROM  Extension: normal   Flexion: normal     Muscle Strength     The patient has normal right knee strength      Tests   Patellar Apprehension: positive    Other   Erythema: absent  Scars: absent  Sensation: normal  Pulse: present  Swelling: none  Other tests: no effusion present    Comments:  No ecchymosis or deformity   positive patellar grind test             no imaging for review today    Procedures  No Procedures performed today    Portions of the record may have been created with voice recognition software   Occasional wrong word or "sound a like" substitutions may have occurred due to the inherent limitations of voice recognition software   Read the chart carefully and recognize, using context, where substitutions have occurred

## 2018-11-16 ENCOUNTER — EVALUATION (OUTPATIENT)
Dept: PHYSICAL THERAPY | Facility: CLINIC | Age: 53
End: 2018-11-16
Payer: COMMERCIAL

## 2018-11-16 DIAGNOSIS — M22.2X1 PATELLOFEMORAL SYNDROME OF BOTH KNEES: Primary | ICD-10-CM

## 2018-11-16 DIAGNOSIS — M22.2X2 PATELLOFEMORAL SYNDROME OF BOTH KNEES: Primary | ICD-10-CM

## 2018-11-16 DIAGNOSIS — M17.0 ARTHRITIS OF BOTH KNEES: ICD-10-CM

## 2018-11-16 DIAGNOSIS — M25.562 LEFT KNEE PAIN, UNSPECIFIED CHRONICITY: ICD-10-CM

## 2018-11-16 PROCEDURE — G8979 MOBILITY GOAL STATUS: HCPCS | Performed by: PHYSICAL THERAPIST

## 2018-11-16 PROCEDURE — G8980 MOBILITY D/C STATUS: HCPCS | Performed by: PHYSICAL THERAPIST

## 2018-11-16 PROCEDURE — G8978 MOBILITY CURRENT STATUS: HCPCS | Performed by: PHYSICAL THERAPIST

## 2018-11-16 PROCEDURE — 97110 THERAPEUTIC EXERCISES: CPT | Performed by: PHYSICAL THERAPIST

## 2018-11-16 NOTE — PROGRESS NOTES
PT Re-Evaluation     Today's date: 2018  Patient name: Carly Sanon  : 1965  MRN: [id number]  Referring provider: Marquis Garnett DO  Dx:   Encounter Diagnosis     ICD-10-CM    1  Patellofemoral syndrome of both knees M22 2X1     M22 2X2    2  Arthritis of both knees M17 0    3  Left knee pain, unspecified chronicity M25 562        Start Time: 831  Stop Time: 903  Total time in clinic (min): 32 minutes    Assessment  Impairments: abnormal gait, activity intolerance, impaired balance, impaired physical strength, lacks appropriate home exercise program, pain with function and poor posture     Assessment details: Patient has demonstrated functional improvement with improved tolerance to ambulation and descending stairs since initiating physical therapy  She still has difficulty with prolonged ambulation and ascending stairs secondary to medial right knee pain  Patient continues to demonstrate weakness or right gluteals likely contributing to remaining functional deficits  Patient was educated on Diaz taping for medial patellar glide as directed by referring physician  Patient demonstrated verbal understanding of tape application following demonstration  HEP was updated    Understanding of Dx/Px/POC: good   Prognosis: good    Goals  STG 4 Weeks:  Decrease pain at worst to 6/10-met  Improve range to SLR to 60-ongoing  Improve strength to 4-/5 hip-ongoing  Independent with HEP-met  LTG 8 Weeks:  Decrease pain at worst to 3/10-ongoing  Improve range to 65-ongoing  Improve strength to 4-/5 hip-ongoing  Able to perform all desired activities with minimal to nil symptom exacerbation-ongoing      Plan  Patient would benefit from: skilled physical therapy  Planned modality interventions: cryotherapy and thermotherapy: hydrocollator packs  Planned therapy interventions: abdominal trunk stabilization, joint mobilization, manual therapy, neuromuscular re-education, patient education, postural training, home exercise program, graded motor, graded exercise, graded activity, gait training, functional ROM exercises, flexibility, therapeutic exercise, therapeutic activities, stretching and strengthening  Frequency: 2x week  Duration in weeks: 8  Treatment plan discussed with: patient        Subjective Evaluation    History of Present Illness  Date of onset: 10/4/2018  Mechanism of injury: Patient reports that she has noticed an improvement since starting physical therapy and receiving an injection  Patient reports that she has noticed more mobility with increased tolerance to standing at work  Patient reports that she has been walking with decreased pain  She states that she has also noted a decrease in pain in pain with stair management  She reports that she continues to get some medial knee pain on right knee with ascending stairs  Patient states that she does not have pain with descending stairs  Patient reports that she has returned to 75% prior level of function  Pain  No pain reported  Current pain ratin  At best pain ratin  At worst pain ratin  Quality: dull ache, needle-like, sharp and radiating  Relieving factors: change in position and medications  Aggravating factors: standing, walking and stair climbing  Progression: worsening      Diagnostic Tests  X-ray: abnormal (OA)  Treatments  Previous treatment: injection treatment  Patient Goals  Patient goals for therapy: decreased pain, improved balance, increased motion, increased strength and return to sport/leisure activities          Objective     Active Range of Motion   Left Knee   Flexion: 115 degrees   Extension: Left knee active extension: 2HE  Additional Active Range of Motion Details  Sensation intact to light touch L3,4,5,S1,S2  Antalgia with R > L IC     Genu Valgus L > R  SLS L 5" R 1"  Hip strength  L Flex 4 Ext 4 Abd 3 Add 4-  R Flex Ext Abd Add  Knee Strength  L 5/5 flex ext  R 5/5 flex ext  Quad Set L good R fair  Palpation: Mild pain along medial joint line  Joint Mobility  L AP PA ML LM Grade 2, Patellar Gross 4  R AP PA ML LM Grade 2, Patellar  STs:   L + Luis, (-) Lochman, (-) Ant/Post Drawer, (-) Valgus/Varus, + Patellar Scour, SLR 55  R + Valgus, (-) Varus, + SLR 55, (-) Ant/Post Drawer, + Luis,   TA draw 8" before form failure               Precautions: Asthma, Vit D deficiency    Daily Treatment Diary      Precautions: Asthma, Vit D deficiency     Daily Treatment Diary         Manual 10/4  10/8  10/12  10/19  10/26  10/29  11/15         AP PA Tib on Femur R    6 min  6 min  6 min  TS  8  NP         PROM flex R knee    4 min  4 min  4 min  LK  10  NP                                                                                 Exercise Diary                       Bike Trial    6 min  TM 6 mins  TM6  TM 6  TM 6  NP         Quad Set  6" x 10  6" x10  6" x 10  6" x10  10"x10  HEP  NP         SLR Flex 2 x 10  2x10  2 x 10  2x10  2x10  3 x10  NP         TA Draw + Bridge  3" 2 x 5  3" x 10  3" x 10  3 x 10  2x10 3"   2 x 10 3"  NP         Ham ST with Strap B 20" x 4  20" x 4  20" x 4  20" x 4  20"x4  20" x 4  NP         Seated Gastroc ST with Strap    20" x 4  20" x 4  Wedge 30" x 4  wedge 30"x4  Wedge 30" x 4  NP         HR/TR trial UE support    2  X 10  2 x 10  2 x 10  2x10  3 x 10  NP         LAQ B    2# 2 x 10  2# 2 x 10  2 5#x20  3# 20x   4# 2 x 10  NP         Clam Shell Supine   RTB 2 x 10  GTB 2 x 10  GTB 2 x 10  GTB 20x  BTB20x NP         3 way hip   nv  2 x 10  2 x 10  2x10  2 x 10 NP          sit to stand      nv  nv  2x10  2 x 10  NP          SLS B           10" x10   NP                                                                                                                                                                                                                                 Modalities                       CP to B knee    10 min  10 min  10 mins  defered  declined            Right medial patellar glide Mann ESCOBAR

## 2018-12-06 NOTE — PROGRESS NOTES
PT Discharge    Today's date: 2018  Patient name: Nay Valencia  : 1965  MRN: [id number]  Referring provider: Sami Granados DO  Dx:   Encounter Diagnosis     ICD-10-CM    1  Patellofemoral syndrome of both knees M22 2X1 PT plan of care cert/re-cert    B42 8V8    2  Arthritis of both knees M17 0 PT plan of care cert/re-cert   3  Left knee pain, unspecified chronicity M25 562 PT plan of care cert/re-cert       Start Time: 831  Stop Time: 903  Total time in clinic (min): 32 minutes    Assessment/Plan Pt has not been present since 18  Pt's chart will be DC in compliance of facility policy as all Charts are DC within 30 days of last scheduled visit          Subjective    Objective    Flowsheet Rows      Most Recent Value   PT/OT G-Codes   Assessment Type  Discharge   G code set  Mobility: Walking & Moving Around   Mobility: Walking and Moving Around Current Status ()  CI   Mobility: Walking and Moving Around Goal Status ()  CI   Mobility: Walking and Moving Around Discharge Status ()  CI

## 2019-06-17 DIAGNOSIS — Z12.39 ENCOUNTER FOR SCREENING FOR MALIGNANT NEOPLASM OF BREAST: Primary | ICD-10-CM

## 2019-11-07 ENCOUNTER — TELEPHONE (OUTPATIENT)
Dept: FAMILY MEDICINE CLINIC | Facility: CLINIC | Age: 54
End: 2019-11-07

## 2019-11-25 ENCOUNTER — OFFICE VISIT (OUTPATIENT)
Dept: FAMILY MEDICINE CLINIC | Facility: CLINIC | Age: 54
End: 2019-11-25
Payer: COMMERCIAL

## 2019-11-25 VITALS
TEMPERATURE: 98.1 F | DIASTOLIC BLOOD PRESSURE: 80 MMHG | SYSTOLIC BLOOD PRESSURE: 130 MMHG | RESPIRATION RATE: 18 BRPM | HEART RATE: 86 BPM | HEIGHT: 64 IN | WEIGHT: 251.2 LBS | BODY MASS INDEX: 42.88 KG/M2 | OXYGEN SATURATION: 98 %

## 2019-11-25 DIAGNOSIS — E66.01 CLASS 3 SEVERE OBESITY DUE TO EXCESS CALORIES WITHOUT SERIOUS COMORBIDITY WITH BODY MASS INDEX (BMI) OF 40.0 TO 44.9 IN ADULT (HCC): ICD-10-CM

## 2019-11-25 DIAGNOSIS — E55.9 VITAMIN D DEFICIENCY: ICD-10-CM

## 2019-11-25 DIAGNOSIS — Z00.00 ANNUAL PHYSICAL EXAM: Primary | ICD-10-CM

## 2019-11-25 DIAGNOSIS — Z12.31 VISIT FOR SCREENING MAMMOGRAM: ICD-10-CM

## 2019-11-25 DIAGNOSIS — Z12.11 COLON CANCER SCREENING: ICD-10-CM

## 2019-11-25 PROCEDURE — 99396 PREV VISIT EST AGE 40-64: CPT | Performed by: FAMILY MEDICINE

## 2019-11-25 NOTE — PROGRESS NOTES
[address]    NAME: José Ball  AGE: 49 y o  SEX: female  : 1965     DATE: 2019     Assessment and Plan:     Problem List Items Addressed This Visit        Other    Vitamin D deficiency    Relevant Orders    Vitamin D 25 hydroxy      Other Visit Diagnoses     Annual physical exam    -  Primary    Relevant Orders    Comprehensive metabolic panel    Lipid Panel with Direct LDL reflex    Colon cancer screening        Relevant Orders    Cologuard    Class 3 severe obesity due to excess calories without serious comorbidity with body mass index (BMI) of 40 0 to 44 9 in adult ([address] )        Relevant Orders    TSH, 3rd generation with Free T4 reflex    Visit for screening mammogram        Relevant Orders    Mammo screening bilateral w cad          Immunizations and preventive care screenings were discussed with patient today  Appropriate education was printed on patient's after visit summary  Counseling:  Alcohol/drug use: discussed moderation in alcohol intake, the recommendations for healthy alcohol use, and avoidance of illicit drug use  Dental Health: discussed importance of regular tooth brushing, flossing, and dental visits  Injury prevention: discussed safety/seat belts, safety helmets, smoke detectors, carbon dioxide detectors, and smoking near bedding or upholstery  Sexual health: discussed sexually transmitted diseases, partner selection, use of condoms, avoidance of unintended pregnancy, and contraceptive alternatives  · Exercise: the importance of regular exercise/physical activity was discussed  Recommend exercise 3-5 times per week for at least 30 minutes  BMI Counseling: Body mass index is 42 83 kg/m²  The BMI is above normal  Nutrition recommendations include decreasing portion sizes and encouraging healthy choices of fruits and vegetables  No pharmacotherapy was ordered             No follow-ups on file      Chief Complaint:     No chief complaint on file  History of Present Illness:     Adult Annual Physical   Patient here for a comprehensive physical exam  The patient reports no problems  Diet and Physical Activity  · Diet/Nutrition: limited junk food  · Exercise: walking  Depression Screening  PHQ-9 Depression Screening    PHQ-9:    Frequency of the following problems over the past two weeks:       Little interest or pleasure in doing things:  0 - not at all  Feeling down, depressed, or hopeless:  0 - not at all  PHQ-2 Score:  0       General Health  · Sleep: sleeps well and gets 7-8 hours of sleep on average  · Hearing: normal - bilateral   · Vision: goes for regular eye exams and most recent eye exam >1 year ago  · Dental: regular dental visits and brushes teeth twice daily  /GYN Health  · Patient is: postmenopausal  · Last menstrual period: years ago  · Contraceptive method: none  Review of Systems:     Review of Systems   Constitutional: Negative  HENT: Negative  Eyes: Negative  Respiratory: Negative  Cardiovascular: Negative  Gastrointestinal: Negative  Endocrine: Negative  Genitourinary: Negative  Musculoskeletal: Negative  Skin: Negative  Allergic/Immunologic: Negative  Neurological: Negative  Hematological: Negative  Psychiatric/Behavioral: Negative  Past Medical History:     History reviewed  No pertinent past medical history     Past Surgical History:     Past Surgical History:   Procedure Laterality Date    DILATION AND CURETTAGE OF UTERUS        Social History:     Social History     Socioeconomic History    Marital status: Single     Spouse name: None    Number of children: None    Years of education: None    Highest education level: None   Occupational History    None   Social Needs    Financial resource strain: None    Food insecurity:     Worry: None     Inability: None    Transportation needs:     Medical: None     Non-medical: None   Tobacco Use    Smoking status: Never Smoker    Smokeless tobacco: Never Used    Tobacco comment: former smoker quit 4 years ago    smoked for at least 20 years    half a pack to one pack of cigarettes a day   Substance and Sexual Activity    Alcohol use: No    Drug use: No    Sexual activity: None   Lifestyle    Physical activity:     Days per week: None     Minutes per session: None    Stress: None   Relationships    Social connections:     Talks on phone: None     Gets together: None     Attends Restorationist service: None     Active member of club or organization: None     Attends meetings of clubs or organizations: None     Relationship status: None    Intimate partner violence:     Fear of current or ex partner: None     Emotionally abused: None     Physically abused: None     Forced sexual activity: None   Other Topics Concern    None   Social History Narrative    None      Family History:     Family History   Problem Relation Age of Onset    Colon cancer Mother     Hypertension Mother     Alcohol abuse Father     Hypertension Sister     Colon cancer Paternal Aunt     Heart disease Family       Current Medications:     Current Outpatient Medications   Medication Sig Dispense Refill    albuterol (VENTOLIN HFA) 90 mcg/act inhaler Inhale 1-2 puffs every 6 (six) hours as needed      ALPRAZolam (XANAX) 0 5 mg tablet Take 1 tablet (0 5 mg total) by mouth daily at bedtime as needed for anxiety 30 tablet 0    Cholecalciferol (VITAMIN D3) 2000 units capsule Take by mouth      diclofenac sodium (VOLTAREN) 1 % Place on the skin      fluticasone (FLONASE) 50 mcg/act nasal spray 2 sprays into each nostril daily      fluticasone (FLONASE) 50 mcg/act nasal spray 1 spray into each nostril daily 16 g 0    fluticasone (FLOVENT HFA) 110 MCG/ACT inhaler Inhale 1 puff 2 (two) times a day 1 Inhaler 2     No current facility-administered medications for this visit         Allergies: Allergies   Allergen Reactions    Seasonal Ic  [Cholestatin]     Sulfamethoxazole-Trimethoprim      Other reaction(s): yeast infection  Annotation - [id number]: reaction is rash      Physical Exam:     /80 (BP Location: Left arm, Patient Position: Sitting, Cuff Size: Standard)   Pulse 86   Temp 98 1 °F (36 7 °C) (Tympanic)   Resp 18   Ht 5' 4 21" (1 631 m)   Wt 114 kg (251 lb 3 2 oz)   SpO2 98%   BMI 42 83 kg/m²     Physical Exam   Constitutional: She is oriented to person, place, and time  Vital signs are normal  She appears well-developed and well-nourished  HENT:   Head: Normocephalic and atraumatic  Nose: Nose normal    Mouth/Throat: Oropharynx is clear and moist    Eyes: Pupils are equal, round, and reactive to light  Neck: Normal range of motion  Neck supple  No thyromegaly present  Cardiovascular: Normal rate and regular rhythm  No murmur heard  Pulmonary/Chest: Effort normal and breath sounds normal    Abdominal: Soft  Bowel sounds are normal    Musculoskeletal: Normal range of motion  She exhibits no edema or deformity  Neurological: She is alert and oriented to person, place, and time  She has normal reflexes  Skin: Skin is warm  No rash noted  No erythema  Psychiatric: She has a normal mood and affect   Her behavior is normal        Amanda Munoz MD  [address] [facility]

## 2019-11-25 NOTE — PATIENT INSTRUCTIONS

## 2020-07-23 ENCOUNTER — TELEPHONE (OUTPATIENT)
Dept: FAMILY MEDICINE CLINIC | Facility: CLINIC | Age: 55
End: 2020-07-23

## 2020-09-04 DIAGNOSIS — J45.20 MILD INTERMITTENT ASTHMA, UNSPECIFIED WHETHER COMPLICATED: Primary | ICD-10-CM

## 2020-09-04 RX ORDER — ALBUTEROL SULFATE 90 UG/1
1-2 AEROSOL, METERED RESPIRATORY (INHALATION) EVERY 6 HOURS PRN
Qty: 1 INHALER | Refills: 0 | Status: SHIPPED | OUTPATIENT
Start: 2020-09-04 | End: 2020-09-28

## 2020-09-04 NOTE — TELEPHONE ENCOUNTER
Medication:albuterol (VENTOLIN HFA) 90 mcg/act inhaler       Dosage:1-2 puff  How Often:Inhale 1-2 puffs every 6 (six) hours as needed  Quantity:    Last Office Visit: 11/25/2019  Next Office Visit:11/27/2020  Last refilled:unk  How many pills left: 0  Pharmacy:   [facility]  [address] [facility] [address] [address]  Phone: [phone] Fax: [phone]

## 2020-09-27 DIAGNOSIS — J45.20 MILD INTERMITTENT ASTHMA, UNSPECIFIED WHETHER COMPLICATED: ICD-10-CM

## 2020-09-28 RX ORDER — ALBUTEROL SULFATE 90 UG/1
1-2 AEROSOL, METERED RESPIRATORY (INHALATION) EVERY 6 HOURS PRN
Qty: 1 INHALER | Refills: 5 | Status: SHIPPED | OUTPATIENT
Start: 2020-09-28

## 2020-11-27 ENCOUNTER — OFFICE VISIT (OUTPATIENT)
Dept: FAMILY MEDICINE CLINIC | Facility: CLINIC | Age: 55
End: 2020-11-27
Payer: COMMERCIAL

## 2020-11-27 VITALS
HEART RATE: 82 BPM | WEIGHT: 257.6 LBS | OXYGEN SATURATION: 92 % | BODY MASS INDEX: 43.98 KG/M2 | DIASTOLIC BLOOD PRESSURE: 70 MMHG | HEIGHT: 64 IN | SYSTOLIC BLOOD PRESSURE: 120 MMHG | TEMPERATURE: 98 F

## 2020-11-27 DIAGNOSIS — N95.0 POST-MENOPAUSAL BLEEDING: ICD-10-CM

## 2020-11-27 DIAGNOSIS — Z12.31 VISIT FOR SCREENING MAMMOGRAM: ICD-10-CM

## 2020-11-27 DIAGNOSIS — Z13.220 LIPID SCREENING: ICD-10-CM

## 2020-11-27 DIAGNOSIS — Z23 ENCOUNTER FOR ADMINISTRATION OF VACCINE: ICD-10-CM

## 2020-11-27 DIAGNOSIS — Z12.11 COLON CANCER SCREENING: ICD-10-CM

## 2020-11-27 DIAGNOSIS — J45.20 MILD INTERMITTENT ASTHMA WITHOUT COMPLICATION: Primary | ICD-10-CM

## 2020-11-27 DIAGNOSIS — E66.01 MORBID (SEVERE) OBESITY DUE TO EXCESS CALORIES (HCC): ICD-10-CM

## 2020-11-27 PROCEDURE — 90682 RIV4 VACC RECOMBINANT DNA IM: CPT

## 2020-11-27 PROCEDURE — 3725F SCREEN DEPRESSION PERFORMED: CPT | Performed by: FAMILY MEDICINE

## 2020-11-27 PROCEDURE — 1036F TOBACCO NON-USER: CPT | Performed by: FAMILY MEDICINE

## 2020-11-27 PROCEDURE — 90471 IMMUNIZATION ADMIN: CPT

## 2020-11-27 PROCEDURE — 90670 PCV13 VACCINE IM: CPT

## 2020-11-27 PROCEDURE — 90472 IMMUNIZATION ADMIN EACH ADD: CPT

## 2020-11-27 PROCEDURE — 3008F BODY MASS INDEX DOCD: CPT | Performed by: FAMILY MEDICINE

## 2020-11-27 PROCEDURE — 99214 OFFICE O/P EST MOD 30 MIN: CPT | Performed by: FAMILY MEDICINE

## 2020-11-27 RX ORDER — FLUTICASONE PROPIONATE 110 UG/1
1 AEROSOL, METERED RESPIRATORY (INHALATION) 2 TIMES DAILY
Qty: 1 INHALER | Refills: 2 | Status: SHIPPED | OUTPATIENT
Start: 2020-11-27

## 2020-12-28 ENCOUNTER — TELEPHONE (OUTPATIENT)
Dept: FAMILY MEDICINE CLINIC | Facility: CLINIC | Age: 55
End: 2020-12-28

## 2020-12-28 DIAGNOSIS — Z20.822 EXPOSURE TO COVID-19 VIRUS: Primary | ICD-10-CM

## 2020-12-28 PROCEDURE — U0003 INFECTIOUS AGENT DETECTION BY NUCLEIC ACID (DNA OR RNA); SEVERE ACUTE RESPIRATORY SYNDROME CORONAVIRUS 2 (SARS-COV-2) (CORONAVIRUS DISEASE [COVID-19]), AMPLIFIED PROBE TECHNIQUE, MAKING USE OF HIGH THROUGHPUT TECHNOLOGIES AS DESCRIBED BY CMS-2020-01-R: HCPCS | Performed by: FAMILY MEDICINE

## 2020-12-29 LAB — SARS-COV-2 RNA SPEC QL NAA+PROBE: NOT DETECTED

## 2021-01-18 ENCOUNTER — TELEPHONE (OUTPATIENT)
Dept: FAMILY MEDICINE CLINIC | Facility: CLINIC | Age: 56
End: 2021-01-18

## 2021-01-18 DIAGNOSIS — Z20.822 EXPOSURE TO COVID-19 VIRUS: Primary | ICD-10-CM

## 2021-01-18 NOTE — TELEPHONE ENCOUNTER
Pt called she was exposed to dtr and most recently at work 01/14/21  no masks/within 6 ft more then 15 min pt has chills, headache/cough/val/runny nose sore throat pt asked to be tested

## 2021-01-19 PROCEDURE — U0003 INFECTIOUS AGENT DETECTION BY NUCLEIC ACID (DNA OR RNA); SEVERE ACUTE RESPIRATORY SYNDROME CORONAVIRUS 2 (SARS-COV-2) (CORONAVIRUS DISEASE [COVID-19]), AMPLIFIED PROBE TECHNIQUE, MAKING USE OF HIGH THROUGHPUT TECHNOLOGIES AS DESCRIBED BY CMS-2020-01-R: HCPCS | Performed by: FAMILY MEDICINE

## 2021-01-19 PROCEDURE — U0005 INFEC AGEN DETEC AMPLI PROBE: HCPCS | Performed by: FAMILY MEDICINE

## 2021-01-21 ENCOUNTER — TELEMEDICINE (OUTPATIENT)
Dept: FAMILY MEDICINE CLINIC | Facility: CLINIC | Age: 56
End: 2021-01-21
Payer: COMMERCIAL

## 2021-01-21 ENCOUNTER — HOSPITAL ENCOUNTER (OUTPATIENT)
Dept: INFUSION CENTER | Facility: HOSPITAL | Age: 56
Discharge: HOME | End: 2021-01-21
Payer: COMMERCIAL

## 2021-01-21 VITALS
OXYGEN SATURATION: 95 % | DIASTOLIC BLOOD PRESSURE: 71 MMHG | TEMPERATURE: 98.6 F | RESPIRATION RATE: 18 BRPM | SYSTOLIC BLOOD PRESSURE: 144 MMHG | HEART RATE: 76 BPM

## 2021-01-21 DIAGNOSIS — U07.1 COVID-19: Primary | ICD-10-CM

## 2021-01-21 LAB — SARS-COV-2 RNA RESP QL NAA+PROBE: POSITIVE

## 2021-01-21 PROCEDURE — M0243 CASIRIVI AND IMDEVI INFUSION: HCPCS

## 2021-01-21 PROCEDURE — 3725F SCREEN DEPRESSION PERFORMED: CPT | Performed by: FAMILY MEDICINE

## 2021-01-21 PROCEDURE — 99213 OFFICE O/P EST LOW 20 MIN: CPT | Performed by: FAMILY MEDICINE

## 2021-01-21 RX ORDER — ALBUTEROL SULFATE 90 UG/1
3 AEROSOL, METERED RESPIRATORY (INHALATION) ONCE AS NEEDED
Status: CANCELLED | OUTPATIENT
Start: 2021-01-21

## 2021-01-21 RX ORDER — SODIUM CHLORIDE 9 MG/ML
20 INJECTION, SOLUTION INTRAVENOUS ONCE
Status: CANCELLED | OUTPATIENT
Start: 2021-01-21

## 2021-01-21 RX ORDER — SODIUM CHLORIDE 9 MG/ML
20 INJECTION, SOLUTION INTRAVENOUS ONCE
Status: COMPLETED | OUTPATIENT
Start: 2021-01-21 | End: 2021-01-21

## 2021-01-21 RX ORDER — ACETAMINOPHEN 325 MG/1
650 TABLET ORAL ONCE AS NEEDED
Status: CANCELLED | OUTPATIENT
Start: 2021-01-21

## 2021-01-21 RX ORDER — ACETAMINOPHEN 325 MG/1
650 TABLET ORAL ONCE AS NEEDED
Status: DISCONTINUED | OUTPATIENT
Start: 2021-01-21 | End: 2021-01-24 | Stop reason: HOSPADM

## 2021-01-21 RX ORDER — ALBUTEROL SULFATE 90 UG/1
3 AEROSOL, METERED RESPIRATORY (INHALATION) ONCE AS NEEDED
Status: DISCONTINUED | OUTPATIENT
Start: 2021-01-21 | End: 2021-01-24 | Stop reason: HOSPADM

## 2021-01-21 RX ADMIN — SODIUM CHLORIDE 20 ML/HR: 0.9 INJECTION, SOLUTION INTRAVENOUS at 13:05

## 2021-01-21 RX ADMIN — IMDEVIMAB: 1332 INJECTION, SOLUTION, CONCENTRATE INTRAVENOUS at 13:32

## 2021-01-21 NOTE — NURSING NOTE
Pt arrived for regeneron infusion  Pt alert, oriented, ambulated without difficulty from car to infusion center  VSS, chart updated with current meds and allergies  EUA reviewed with patient and patient consented to all treatment today  Patient made aware of potential side effects and potential allergic reaction s/s  Patient verbalized understanding to alert RNs to s/s of infusion reaction  Chart alerted RN that an outpatient consent form has not been completed in chart, this RN unable to complete that documentation because RN is unable to access form in the chart   Verbal verification with both RNs Aimee Gee and Timmie Skiff RN completed at this time

## 2021-01-21 NOTE — PROGRESS NOTES
COVID-19 Virtual Visit     Assessment/Plan:    Problem List Items Addressed This Visit        Other    COVID-19 - Primary         Disposition:     I recommended continued isolation until at least 24 hours have passed since recovery defined as resolution of fever without the use of fever-reducing medications AND improvement in COVID symptoms AND 10 days have passed since onset of symptoms (or 10 days have passed since date of first positive viral diagnostic test for asymptomatic patients)  Patient meets criteria for Casirivimab/Imdevimab infusion  They were counseled in regards to risks, benefits, and side effects of this infusion  Casirivimab and imdevimab are investigational medicines used to treat mild to moderate symptoms of COVID-19 in non-hospitalized adults and adolescents (15years of age and older who weigh at least 80 pounds (40 kg)), and who are at high risk for developing severe COVID-19 symptoms or the need for hospitalization  Casirivimab and imdevimab are investigational because they are still being studied  There is limited information known about the safety and effectiveness of using casirivimab and imdevimab to treat people with COVID-19  The FDA has authorized the emergency use of casirivimab and imdevimab for the treatment of COVID-19 under an Emergency Use Authorization (EUA)  Possible side effects of casirivimab and imdevimab: Allergic reactions can happen during and after infusion with casirivimab and imdevimab  Possible reactions include: fever, chills, nausea, headache, shortness of breath, low blood pressure, wheezing, swelling of your lips, face, or throat, rash including hives, itching, muscle aches, and dizziness  The side effects of getting any medicine by vein may include brief pain, bleeding, bruising of the skin, soreness, swelling, and possible infection at the infusion site  These are not all the possible side effects of casirivimab and imdevimab   Not a lot of people have been given casirivimab and imdevimab  Serious and unexpected side effects may happen  Casirivimab and imdevimab are still being studied so it is possible that all of the risks are not known at this time  It is possible that casirivimab and imdevimab could interfere with your body's own ability to fight off a future infection of SARS-CoV-2  Similarly, casirivimab and imdevimab may reduce your body's immune response to a vaccine for SARS-CoV-2  Specific studies have not been conducted to address these possible risks  Emergency Use Authorization:    The [facility] FDA has made casirivimab and imdevimab available under an emergency access mechanism called an EUA  The EUA is supported by a  of Health and Human Service ([facility]) declaration that circumstances exist to justify the emergency use of drugs and biological products during the COVID-19 pandemic  Casirivimab and imdevimab have not undergone the same type of review as an FDA-approved or cleared product  The FDA may issue an EUA when certain criteria are met, which includes that there are no adequate, approved, available alternatives  In addition, the FDA decision is based on the totality of scientific evidence available showing that it is reasonable to believe that the product meets certain criteria for safety, performance, and labeling and may be effective in treatment of patients during the COVID-19 pandemic  All of these criteria must be met to allow for the product to be used in the treatment of patients during the COVID-19 pandemic  The EUA for casirivimab and imdevimab is in effect for the duration of the COVID-19 declaration justifying emergency use of these products, unless terminated or revoked (after which the products may no longer be used)       Regarding COVID-19 Vaccination:    Currently there is no data or safety or efficacy of COVID-19 vaccination in persons who received monoclonal antibodies as part of COVID-19 treatment  Treatment should be deferred for at least 90 days to avoid interference of the treatment with vaccine-induced immune responses (this is based on estimated half-life of therapies and evidence suggesting reinfection is uncommon within 90 days of initial infection)  The patient consents to proceed with casirivimab and imdevimab infusion  I have spent 15 minutes directly with the patient  Encounter provider Merlin Jumbo, MD    Provider located at [place] [phone]    Recent Visits  Date Type Provider Dept   01/18/21 Telephone Shandra Apple   Showing recent visits within past 7 days and meeting all other requirements     Today's Visits  Date Type Provider Dept   01/21/21 Telemedicine Merlin Jumbo, MD [facility]   Showing today's visits and meeting all other requirements     Future Appointments  No visits were found meeting these conditions  Showing future appointments within next 150 days and meeting all other requirements      This virtual check-in was done via [employer] and patient was informed that this is not a secure, HIPAA-compliant platform  She agrees to proceed  Patient agrees to participate in a virtual check in via telephone or video visit instead of presenting to the office to address urgent/immediate medical needs  Patient is aware this is a billable service  After connecting through [facility], the patient was identified by name and date of birth  Candy Edgar was informed that this was a telemedicine visit and that the exam was being conducted confidentially over secure lines  My office door was closed  No one else was in the room  Candy Edgar acknowledged consent and understanding of privacy and security of the telemedicine visit   I informed the patient that I have reviewed her record in Epic and presented the opportunity for her to ask any questions regarding the visit today  The patient agreed to participate  Subjective:   Jyoti Rodriguez is a 48 y o  female who has been screened for COVID-19  Symptom change since last report: unchanged  Patient's symptoms include fever, chills, fatigue, nasal congestion, rhinorrhea, anosmia, cough, nausea and myalgias  Patient denies malaise, sore throat, loss of taste, shortness of breath, chest tightness, abdominal pain, vomiting, diarrhea and headaches  Kya Kan has been staying home and has isolated themselves in her home  She is taking care to not share personal items and is cleaning all surfaces that are touched often, like counters, tabletops, and doorknobs using household cleaning sprays or wipes  She is wearing a mask when she leaves her room  Date of symptom onset: 1/15/2021  Date of positive COVID-19 PCR: 1/19/2021    Lab Results   Component Value Date    SARSCOV2 Positive (A) 01/19/2021    SARSCOV2 Not Detected 12/28/2020     No past medical history on file  Past Surgical History:   Procedure Laterality Date    DILATION AND CURETTAGE OF UTERUS       Current Outpatient Medications   Medication Sig Dispense Refill    albuterol (PROVENTIL HFA,VENTOLIN HFA) 90 mcg/act inhaler INHALE 1-2 PUFFS EVERY 6 (SIX) HOURS AS NEEDED FOR WHEEZING 1 Inhaler 5    ALPRAZolam (XANAX) 0 5 mg tablet Take 1 tablet (0 5 mg total) by mouth daily at bedtime as needed for anxiety 30 tablet 0    Cholecalciferol (VITAMIN D3) 2000 units capsule Take 10,000 Units by mouth Take one capsule by mouth daily   diclofenac sodium (VOLTAREN) 1 % Place on the skin      fluticasone (FLOVENT HFA) 110 MCG/ACT inhaler Inhale 1 puff 2 (two) times a day 1 Inhaler 2    OLIVE LEAF EXTRACT PO Take by mouth Take one capsule by mouth daily        fluticasone (FLONASE) 50 mcg/act nasal spray 2 sprays into each nostril daily      fluticasone (FLONASE) 50 mcg/act nasal spray 1 spray into each nostril daily (Patient not taking: Reported on 11/27/2020) 16 g 0     No current facility-administered medications for this visit  Allergies   Allergen Reactions    Seasonal Ic  [Cholestatin]     Sulfamethoxazole-Trimethoprim      Other reaction(s): yeast infection  Annotation - [id number]: reaction is rash       Review of Systems   Constitutional: Positive for chills, fatigue and fever  HENT: Positive for congestion and rhinorrhea  Negative for sore throat  Respiratory: Positive for cough  Negative for chest tightness and shortness of breath  Gastrointestinal: Positive for nausea  Negative for abdominal pain, diarrhea and vomiting  Musculoskeletal: Positive for myalgias  Neurological: Negative for headaches  Objective:    Vitals:       Physical Exam  Constitutional:       Appearance: Normal appearance  Pulmonary:      Effort: Pulmonary effort is normal       Breath sounds: Normal breath sounds  Neurological:      General: No focal deficit present  Mental Status: She is alert and oriented to person, place, and time  Psychiatric:         Mood and Affect: Mood normal          Behavior: Behavior normal        VIRTUAL VISIT DISCLAIMER    Amelie Clemente acknowledges that she has consented to an online visit or consultation  She understands that the online visit is based solely on information provided by her, and that, in the absence of a face-to-face physical evaluation by the physician, the diagnosis she receives is both limited and provisional in terms of accuracy and completeness  This is not intended to replace a full medical face-to-face evaluation by the physician  Amelie Clemente understands and accepts these terms

## 2021-01-22 ENCOUNTER — TELEMEDICINE (OUTPATIENT)
Dept: FAMILY MEDICINE CLINIC | Facility: CLINIC | Age: 56
End: 2021-01-22
Payer: COMMERCIAL

## 2021-01-22 ENCOUNTER — VBI (OUTPATIENT)
Dept: ADMINISTRATIVE | Facility: OTHER | Age: 56
End: 2021-01-22

## 2021-01-22 VITALS — TEMPERATURE: 97.6 F

## 2021-01-22 DIAGNOSIS — U07.1 COVID-19: Primary | ICD-10-CM

## 2021-01-22 PROCEDURE — 99213 OFFICE O/P EST LOW 20 MIN: CPT | Performed by: FAMILY MEDICINE

## 2021-01-22 NOTE — TELEPHONE ENCOUNTER
01/22/21 8:02 AM     [facility]       Filomena Zaragozaland

## 2021-01-22 NOTE — PROGRESS NOTES
COVID-19 Virtual Visit     Assessment/Plan:    Problem List Items Addressed This Visit        Other    COVID-19 - Primary         Disposition:     I recommended continued isolation until at least 24 hours have passed since recovery defined as resolution of fever without the use of fever-reducing medications AND improvement in COVID symptoms AND 10 days have passed since onset of symptoms (or 10 days have passed since date of first positive viral diagnostic test for asymptomatic patients)  Day #7  One day after monoclonal infusion   Feeling better   Recheck next week     I have spent 15 minutes directly with the patient  Encounter provider Yannick Holland MD    Provider located at [address] [phone]    Recent Visits  Date Type Provider Dept   01/21/21 [address], MD [address] [place] [address]   01/18/21 Telephone Kei Apple   Showing recent visits within past 7 days and meeting all other requirements     Today's Visits  Date Type Provider Dept   01/22/21 Telemedicine MD James Fabian   Showing today's visits and meeting all other requirements     Future Appointments  No visits were found meeting these conditions  Showing future appointments within next 150 days and meeting all other requirements        Patient agrees to participate in a virtual check in via telephone or video visit instead of presenting to the office to address urgent/immediate medical needs  Patient is aware this is a billable service  After connecting through Telephone, the patient was identified by name and date of birth  Jyoti Rodriguez was informed that this was a telemedicine visit and that the exam was being conducted confidentially over secure lines  My office door was closed  No one else was in the room   Jyoti Rodriguez acknowledged consent and understanding of privacy and security of the telemedicine visit  I informed the patient that I have reviewed her record in Epic and presented the opportunity for her to ask any questions regarding the visit today  The patient agreed to participate  It was my intent to perform this visit via video technology but the patient was not able to do a video connection so the visit was completed via audio telephone only  Subjective:   José Ball is a 48 y o  female who has been screened for COVID-19  Patient's symptoms include fever, chills, nausea and myalgias  Patient denies fatigue, malaise, congestion, rhinorrhea, sore throat, anosmia, loss of taste, cough, shortness of breath, chest tightness, abdominal pain, vomiting, diarrhea and headaches  Dax Underwood has been staying home and has isolated themselves in her home  She is taking care to not share personal items and is cleaning all surfaces that are touched often, like counters, tabletops, and doorknobs using household cleaning sprays or wipes  She is wearing a mask when she leaves her room  Date of symptom onset: 1/15/2021  Date of positive COVID-19 PCR: 1/19/2021    Monoclonal Antibody Follow-up Symptom Questionnaire  I feel overall: somewhat better  My breathing is: somewhat better  My fever is: same  My fatigue is: somewhat better    Casirivimab/Imdevimab Adverse Reaction:   Patient received Casirivimab/Imdevimab on: 1/21/2021  Patient reports an adverse reaction after receiving infusion  General side effects: chills and nausea  1 hour after the infusion     Lab Results   Component Value Date    SARSCOV2 Positive (A) 01/19/2021    SARSCOV2 Not Detected 12/28/2020     No past medical history on file    Past Surgical History:   Procedure Laterality Date    DILATION AND CURETTAGE OF UTERUS       Current Outpatient Medications   Medication Sig Dispense Refill    albuterol (PROVENTIL HFA,VENTOLIN HFA) 90 mcg/act inhaler INHALE 1-2 PUFFS EVERY 6 (SIX) HOURS AS NEEDED FOR WHEEZING 1 Inhaler 5    ALPRAZolam (XANAX) 0 5 mg tablet Take 1 tablet (0 5 mg total) by mouth daily at bedtime as needed for anxiety 30 tablet 0    Cholecalciferol (VITAMIN D3) 2000 units capsule Take 10,000 Units by mouth Take one capsule by mouth daily   diclofenac sodium (VOLTAREN) 1 % Place on the skin      fluticasone (FLONASE) 50 mcg/act nasal spray 2 sprays into each nostril daily      fluticasone (FLOVENT HFA) 110 MCG/ACT inhaler Inhale 1 puff 2 (two) times a day 1 Inhaler 2    OLIVE LEAF EXTRACT PO Take by mouth Take one capsule by mouth daily   fluticasone (FLONASE) 50 mcg/act nasal spray 1 spray into each nostril daily (Patient not taking: Reported on 11/27/2020) 16 g 0     No current facility-administered medications for this visit  Facility-Administered Medications Ordered in Other Visits   Medication Dose Route Frequency Provider Last Rate Last Admin    acetaminophen (TYLENOL) tablet 650 mg  650 mg Oral Once PRN Melisa George MD        albuterol (PROVENTIL HFA,VENTOLIN HFA) inhaler 3 puff  3 puff Inhalation Once PRN Melisa George MD         Allergies   Allergen Reactions    Seasonal Ic  [Cholestatin]     Sulfamethoxazole-Trimethoprim      Other reaction(s): yeast infection  Annotation - [id number]: reaction is rash       Review of Systems   Constitutional: Positive for chills and fever  Negative for fatigue  HENT: Negative for congestion, rhinorrhea and sore throat  Respiratory: Negative for cough, chest tightness and shortness of breath  Gastrointestinal: Positive for nausea  Negative for abdominal pain, diarrhea and vomiting  Musculoskeletal: Positive for myalgias  Neurological: Negative for headaches  Objective:    Vitals:    01/22/21 1530   Temp: 97 6 °F (36 4 °C)       Physical Exam  Constitutional:       Appearance: Normal appearance  Pulmonary:      Effort: Pulmonary effort is normal    Neurological:      Mental Status: She is alert         VIRTUAL VISIT [address] Colton Dupont acknowledges that she has consented to an online visit or consultation  She understands that the online visit is based solely on information provided by her, and that, in the absence of a face-to-face physical evaluation by the physician, the diagnosis she receives is both limited and provisional in terms of accuracy and completeness  This is not intended to replace a full medical face-to-face evaluation by the physician  Sal Tejeda understands and accepts these terms

## 2021-01-25 ENCOUNTER — TELEMEDICINE (OUTPATIENT)
Dept: FAMILY MEDICINE CLINIC | Facility: CLINIC | Age: 56
End: 2021-01-25
Payer: COMMERCIAL

## 2021-01-25 DIAGNOSIS — U07.1 COVID-19: Primary | ICD-10-CM

## 2021-01-25 PROCEDURE — 99213 OFFICE O/P EST LOW 20 MIN: CPT | Performed by: FAMILY MEDICINE

## 2021-01-25 RX ORDER — MECLIZINE HYDROCHLORIDE 25 MG/1
25 TABLET ORAL EVERY 8 HOURS PRN
Qty: 30 TABLET | Refills: 0 | Status: SHIPPED | OUTPATIENT
Start: 2021-01-25 | End: 2021-07-07 | Stop reason: ALTCHOICE

## 2021-01-25 NOTE — PROGRESS NOTES
COVID-19 Virtual Visit     Assessment/Plan:    Problem List Items Addressed This Visit        Other    COVID-19 - Primary    Relevant Medications    meclizine (ANTIVERT) 25 mg tablet         Disposition:     I recommended continued isolation until at least 24 hours have passed since recovery defined as resolution of fever without the use of fever-reducing medications AND improvement in COVID symptoms AND 10 days have passed since onset of symptoms (or 10 days have passed since date of first positive viral diagnostic test for asymptomatic patients)  Day # 10   Improving but not at the baseline   Meclizine as needed  Recheck in 2 days     I have spent 15 minutes directly with the patient  Encounter provider Joseph Douglas MD    Provider located at [address] [address]    Recent Visits  Date Type Provider Dept   01/22/21 [address], MD [address] [place] [address] [facility]p   01/21/21 [address] MD James Sorenson [facility]   01/18/21 Telephone Dre Barrowa [facility]   Showing recent visits within past 7 days and meeting all other requirements     Today's Visits  Date Type Provider Dept   01/25/21 Telemedicine MD James Shah [facility]   Showing today's visits and meeting all other requirements     Future Appointments  No visits were found meeting these conditions  Showing future appointments within next 150 days and meeting all other requirements        Patient agrees to participate in a virtual check in via telephone or video visit instead of presenting to the office to address urgent/immediate medical needs  Patient is aware this is a billable service  After connecting through Telephone, the patient was identified by name and date of birth  Osvaldo Carnes was informed that this was a telemedicine visit and that the exam was being conducted confidentially over secure lines  My office door was closed   No one else was in the room  Carina Morfin acknowledged consent and understanding of privacy and security of the telemedicine visit  I informed the patient that I have reviewed her record in Epic and presented the opportunity for her to ask any questions regarding the visit today  The patient agreed to participate  It was my intent to perform this visit via video technology but the patient was not able to do a video connection so the visit was completed via audio telephone only  Subjective:   Carina Morfin is a 48 y o  female who has been screened for COVID-19  Symptom change since last report: improving  Patient's symptoms include anosmia, loss of taste, cough, nausea and headache  Patient denies fever, chills, fatigue, malaise, congestion, rhinorrhea, sore throat, shortness of breath, chest tightness, abdominal pain, vomiting, diarrhea and myalgias  Grazyna Arteaga has been staying home and has isolated themselves in her home  She is taking care to not share personal items and is cleaning all surfaces that are touched often, like counters, tabletops, and doorknobs using household cleaning sprays or wipes  She is wearing a mask when she leaves her room  Date of symptom onset: 1/15/2021  Date of positive COVID-19 PCR: 1/19/2021    Monoclonal Antibody Follow-up Symptom Questionnaire  I feel overall: similar  My fever is: same  My fatigue is: similar  Infusion 1/21/2021  More dizzy now than last week    Lab Results   Component Value Date    SARSCOV2 Positive (A) 01/19/2021    [facility] 98 Not Detected 12/28/2020     No past medical history on file    Past Surgical History:   Procedure Laterality Date    DILATION AND CURETTAGE OF UTERUS       Current Outpatient Medications   Medication Sig Dispense Refill    albuterol (PROVENTIL HFA,VENTOLIN HFA) 90 mcg/act inhaler INHALE 1-2 PUFFS EVERY 6 (SIX) HOURS AS NEEDED FOR WHEEZING 1 Inhaler 5    ALPRAZolam (XANAX) 0 5 mg tablet Take 1 tablet (0 5 mg total) by mouth daily at bedtime as needed for anxiety 30 tablet 0    Cholecalciferol (VITAMIN D3) 2000 units capsule Take 10,000 Units by mouth Take one capsule by mouth daily   diclofenac sodium (VOLTAREN) 1 % Place on the skin      fluticasone (FLONASE) 50 mcg/act nasal spray 2 sprays into each nostril daily      fluticasone (FLOVENT HFA) 110 MCG/ACT inhaler Inhale 1 puff 2 (two) times a day 1 Inhaler 2    OLIVE LEAF EXTRACT PO Take by mouth Take one capsule by mouth daily   fluticasone (FLONASE) 50 mcg/act nasal spray 1 spray into each nostril daily (Patient not taking: Reported on 11/27/2020) 16 g 0    meclizine (ANTIVERT) 25 mg tablet Take 1 tablet (25 mg total) by mouth every 8 (eight) hours as needed for dizziness 30 tablet 0     No current facility-administered medications for this visit  Allergies   Allergen Reactions    Seasonal Ic  [Cholestatin]     Sulfamethoxazole-Trimethoprim      Other reaction(s): yeast infection  [facility] - [id number]: reaction is rash       Review of Systems   Constitutional: Negative for chills, fatigue and fever  HENT: Negative for congestion, rhinorrhea and sore throat  Respiratory: Positive for cough  Negative for chest tightness and shortness of breath  Gastrointestinal: Positive for nausea  Negative for abdominal pain, diarrhea and vomiting  Musculoskeletal: Negative for myalgias  Neurological: Positive for headaches  Objective:    Vitals:       Physical Exam- no acute distress on the phone  VIRTUAL VISIT DISCLAIMER    Rober Correia acknowledges that she has consented to an online visit or consultation  She understands that the online visit is based solely on information provided by her, and that, in the absence of a face-to-face physical evaluation by the physician, the diagnosis she receives is both limited and provisional in terms of accuracy and completeness   This is not intended to replace a full medical face-to-face evaluation by the physician  Amelie Clemente understands and accepts these terms

## 2021-01-27 ENCOUNTER — TELEMEDICINE (OUTPATIENT)
Dept: FAMILY MEDICINE CLINIC | Facility: CLINIC | Age: 56
End: 2021-01-27
Payer: COMMERCIAL

## 2021-01-27 DIAGNOSIS — U07.1 COVID-19: Primary | ICD-10-CM

## 2021-01-27 PROCEDURE — 99213 OFFICE O/P EST LOW 20 MIN: CPT | Performed by: FAMILY MEDICINE

## 2021-01-27 PROCEDURE — 1036F TOBACCO NON-USER: CPT | Performed by: FAMILY MEDICINE

## 2021-01-27 NOTE — PROGRESS NOTES
COVID-19 Virtual Visit     Assessment/Plan:    Problem List Items Addressed This Visit        Other    COVID-19 - Primary         Disposition:     I recommended continued isolation until at least 24 hours have passed since recovery defined as resolution of fever without the use of fever-reducing medications AND improvement in COVID symptoms AND 10 days have passed since onset of symptoms (or 10 days have passed since date of first positive viral diagnostic test for asymptomatic patients)  Day # 12 of covid symptoms   Day # 6 from the mono clonal antibodies infusion   Not back to her baseline yet  Recheck next week     I have spent 15 minutes directly with the patient  Encounter provider Victorino Garcia MD    Provider located at [address] [address]    Recent Visits  Date Type Provider Dept   01/25/21 [address], MD [address] [place] [address] Master    01/22/21 Telemedicine MD James Abdalla   01/21/21 Telemedicine MD James Abdalla   Showing recent visits within past 7 days and meeting all other requirements     Today's Visits  Date Type Provider Dept   01/27/21 Telemedicine MD James Abdalla   Showing today's visits and meeting all other requirements     Future Appointments  No visits were found meeting these conditions  Showing future appointments within next 150 days and meeting all other requirements      My office door was closed  No one else was in the room  Saman Stern acknowledged consent and understanding of privacy and security of the telemedicine visit  I informed the patient that I have reviewed her record in Epic and presented the opportunity for her to ask any questions regarding the visit today  The patient agreed to participate  Subjective:   Saman Stern is a 48 y o  female who has been screened for COVID-19  Symptom change since last report: improving  Patient's symptoms include fatigue, loss of taste, cough, diarrhea and headache  Patient denies fever, chills, malaise, congestion, rhinorrhea, sore throat, anosmia, shortness of breath, chest tightness, abdominal pain, nausea, vomiting and myalgias  Giana Moffett has been staying home and has isolated themselves in her home  She is taking care to not share personal items and is cleaning all surfaces that are touched often, like counters, tabletops, and doorknobs using household cleaning sprays or wipes  She is wearing a mask when she leaves her room  Date of symptom onset: 1/15/2021  Date of positive COVID-19 PCR: 1/19/2021    Monoclonal Antibody Follow-up Symptom Questionnaire  I feel overall: similar  My breathing is: similar  My fever is: better  My fatigue is: somewhat better    Lab Results   Component Value Date    SARSCOV2 Positive (A) 01/19/2021    SARSCOV2 Not Detected 12/28/2020     No past medical history on file  Past Surgical History:   Procedure Laterality Date    DILATION AND CURETTAGE OF UTERUS       Current Outpatient Medications   Medication Sig Dispense Refill    albuterol (PROVENTIL HFA,VENTOLIN HFA) 90 mcg/act inhaler INHALE 1-2 PUFFS EVERY 6 (SIX) HOURS AS NEEDED FOR WHEEZING 1 Inhaler 5    ALPRAZolam (XANAX) 0 5 mg tablet Take 1 tablet (0 5 mg total) by mouth daily at bedtime as needed for anxiety 30 tablet 0    Cholecalciferol (VITAMIN D3) 2000 units capsule Take 10,000 Units by mouth Take one capsule by mouth daily   diclofenac sodium (VOLTAREN) 1 % Place on the skin      fluticasone (FLONASE) 50 mcg/act nasal spray 2 sprays into each nostril daily      fluticasone (FLOVENT HFA) 110 MCG/ACT inhaler Inhale 1 puff 2 (two) times a day 1 Inhaler 2    meclizine (ANTIVERT) 25 mg tablet Take 1 tablet (25 mg total) by mouth every 8 (eight) hours as needed for dizziness 30 tablet 0    OLIVE LEAF EXTRACT PO Take by mouth Take one capsule by mouth daily        fluticasone (FLONASE) 50 mcg/act nasal spray 1 spray into each nostril daily (Patient not taking: Reported on 1/27/2021) 16 g 0     No current facility-administered medications for this visit  Allergies   Allergen Reactions    Seasonal Ic  [Cholestatin]     Sulfamethoxazole-Trimethoprim      Other reaction(s): yeast infection  Annotation - [id number]: reaction is rash       Review of Systems   Constitutional: Positive for fatigue  Negative for chills and fever  HENT: Negative for congestion, rhinorrhea and sore throat  Respiratory: Positive for cough  Negative for chest tightness and shortness of breath  Gastrointestinal: Positive for diarrhea  Negative for abdominal pain, nausea and vomiting  Musculoskeletal: Negative for myalgias  Neurological: Positive for headaches  Objective:    Vitals:       Physical Exam  Constitutional:       Appearance: Normal appearance  Pulmonary:      Effort: Pulmonary effort is normal    Neurological:      Mental Status: She is alert  Psychiatric:         Mood and Affect: Mood normal          Behavior: Behavior normal        VIRTUAL VISIT DISCLAIMER    Vicky Moncada acknowledges that she has consented to an online visit or consultation  She understands that the online visit is based solely on information provided by her, and that, in the absence of a face-to-face physical evaluation by the physician, the diagnosis she receives is both limited and provisional in terms of accuracy and completeness  This is not intended to replace a full medical face-to-face evaluation by the physician  Vicky Moncada understands and accepts these terms

## 2021-02-01 ENCOUNTER — TELEMEDICINE (OUTPATIENT)
Dept: FAMILY MEDICINE CLINIC | Facility: CLINIC | Age: 56
End: 2021-02-01
Payer: COMMERCIAL

## 2021-02-01 DIAGNOSIS — U07.1 COVID-19: Primary | ICD-10-CM

## 2021-02-01 PROCEDURE — 99213 OFFICE O/P EST LOW 20 MIN: CPT | Performed by: FAMILY MEDICINE

## 2021-02-01 PROCEDURE — 1036F TOBACCO NON-USER: CPT | Performed by: FAMILY MEDICINE

## 2021-02-01 NOTE — PROGRESS NOTES
COVID-19 Virtual Visit     Assessment/Plan:    Problem List Items Addressed This Visit        Other    COVID-19 - Primary         Disposition:       Day #16 today  Symptoms are resolving   Cleared to return to work 2/8/2021            I have spent 15 minutes directly with the patient  Encounter provider Joselyn Marnio MD    Provider located at [place] [address] [address] [facility] [address]    Recent Visits  Date Type Provider Dept   01/27/21 MD James Barr   01/25/21 Telemedicine MD James Howe   Showing recent visits within past 7 days and meeting all other requirements     Today's Visits  Date Type Provider Dept   02/01/21 Telemedicine MD James Howe   Showing today's visits and meeting all other requirements     Future Appointments  No visits were found meeting these conditions  Showing future appointments within next 150 days and meeting all other requirements      This virtual check-in was done via [employer] and patient was informed that this is a secure, HIPAA-compliant platform  She agrees to proceed  Patient agrees to participate in a virtual check in via telephone or video visit instead of presenting to the office to address urgent/immediate medical needs  Patient is aware this is a billable service  After connecting through [facility], the patient was identified by name and date of birth  Kimberlyn Boyd was informed that this was a telemedicine visit and that the exam was being conducted confidentially over secure lines  My office door was closed  No one else was in the room  Kimberlyn Boyd acknowledged consent and understanding of privacy and security of the telemedicine visit  I informed the patient that I have reviewed her record in Epic and presented the opportunity for her to ask any questions regarding the visit today   The patient agreed to participate  Subjective:   Nikko Angel is a 48 y o  female who has been screened for COVID-19  Symptom change since last report: improving  Patient's symptoms include fatigue, cough and headache  Patient denies fever, chills, malaise, congestion, rhinorrhea, sore throat, anosmia, loss of taste, shortness of breath, chest tightness, abdominal pain, nausea, vomiting, diarrhea and myalgias  Polina Gustafson has been staying home and has isolated themselves in her home  She is taking care to not share personal items and is cleaning all surfaces that are touched often, like counters, tabletops, and doorknobs using household cleaning sprays or wipes  She is wearing a mask when she leaves her room  Date of symptom onset: 1/15/2021  Date of positive COVID-19 PCR: 1/19/2021    Less fatigue and tired but improved from last week       Lab Results   Component Value Date    SARSCOV2 Positive (A) 01/19/2021    SARSCOV2 Not Detected 12/28/2020     History reviewed  No pertinent past medical history  Past Surgical History:   Procedure Laterality Date    DILATION AND CURETTAGE OF UTERUS       Current Outpatient Medications   Medication Sig Dispense Refill    albuterol (PROVENTIL HFA,VENTOLIN HFA) 90 mcg/act inhaler INHALE 1-2 PUFFS EVERY 6 (SIX) HOURS AS NEEDED FOR WHEEZING 1 Inhaler 5    ALPRAZolam (XANAX) 0 5 mg tablet Take 1 tablet (0 5 mg total) by mouth daily at bedtime as needed for anxiety 30 tablet 0    Cholecalciferol (VITAMIN D3) 2000 units capsule Take 10,000 Units by mouth Take one capsule by mouth daily        diclofenac sodium (VOLTAREN) 1 % Place on the skin      fluticasone (FLONASE) 50 mcg/act nasal spray 2 sprays into each nostril daily      fluticasone (FLONASE) 50 mcg/act nasal spray 1 spray into each nostril daily (Patient not taking: Reported on 1/27/2021) 16 g 0    fluticasone (FLOVENT HFA) 110 MCG/ACT inhaler Inhale 1 puff 2 (two) times a day 1 Inhaler 2    meclizine (ANTIVERT) 25 mg tablet Take 1 tablet (25 mg total) by mouth every 8 (eight) hours as needed for dizziness 30 tablet 0    OLIVE LEAF EXTRACT PO Take by mouth Take one capsule by mouth daily  No current facility-administered medications for this visit  Allergies   Allergen Reactions    Seasonal Ic  [Cholestatin]     Sulfamethoxazole-Trimethoprim      Other reaction(s): yeast infection  Annotation - [id number]: reaction is rash       Review of Systems   Constitutional: Positive for fatigue  Negative for chills and fever  HENT: Negative for congestion, rhinorrhea and sore throat  Respiratory: Positive for cough  Negative for chest tightness and shortness of breath  Gastrointestinal: Negative for abdominal pain, diarrhea, nausea and vomiting  Endocrine: Negative  Musculoskeletal: Negative for myalgias  Neurological: Positive for headaches  Objective: There were no vitals filed for this visit  Physical Exam  Constitutional:       Appearance: Normal appearance  Pulmonary:      Effort: Pulmonary effort is normal  No respiratory distress  Neurological:      General: No focal deficit present  Mental Status: She is alert  Psychiatric:         Mood and Affect: Mood normal          Behavior: Behavior normal        VIRTUAL VISIT DISCLAIMER    Bruno Millan acknowledges that she has consented to an online visit or consultation  She understands that the online visit is based solely on information provided by her, and that, in the absence of a face-to-face physical evaluation by the physician, the diagnosis she receives is both limited and provisional in terms of accuracy and completeness  This is not intended to replace a full medical face-to-face evaluation by the physician  Bruno Millan understands and accepts these terms

## 2021-04-14 ENCOUNTER — VBI (OUTPATIENT)
Dept: ADMINISTRATIVE | Facility: OTHER | Age: 56
End: 2021-04-14

## 2021-06-16 ENCOUNTER — TELEPHONE (OUTPATIENT)
Dept: FAMILY MEDICINE CLINIC | Facility: CLINIC | Age: 56
End: 2021-06-16

## 2021-06-16 NOTE — TELEPHONE ENCOUNTER
Pt called she is seeing dr Loren Hatch pulmonary and he told pt to request a cxr from her pcp   pls advise

## 2021-06-19 ENCOUNTER — HOSPITAL ENCOUNTER (OUTPATIENT)
Dept: RADIOLOGY | Facility: HOSPITAL | Age: 56
Discharge: HOME | End: 2021-06-19
Payer: COMMERCIAL

## 2021-06-19 ENCOUNTER — APPOINTMENT (OUTPATIENT)
Dept: LAB | Facility: CLINIC | Age: 56
End: 2021-06-19
Payer: COMMERCIAL

## 2021-06-19 DIAGNOSIS — J45.20 MILD INTERMITTENT ASTHMA, UNSPECIFIED WHETHER COMPLICATED: ICD-10-CM

## 2021-06-19 DIAGNOSIS — Z13.220 LIPID SCREENING: ICD-10-CM

## 2021-06-19 LAB
ALBUMIN SERPL BCP-MCNC: 3.7 G/DL (ref 3.5–5)
ALP SERPL-CCNC: 99 U/L (ref 46–116)
ALT SERPL W P-5'-P-CCNC: 54 U/L (ref 12–78)
ANION GAP SERPL CALCULATED.3IONS-SCNC: 8 MMOL/L (ref 4–13)
AST SERPL W P-5'-P-CCNC: 24 U/L (ref 5–45)
BASOPHILS # BLD AUTO: 0.03 THOUSANDS/ΜL (ref 0–0.1)
BASOPHILS NFR BLD AUTO: 0 % (ref 0–1)
BILIRUB SERPL-MCNC: 0.45 MG/DL (ref 0.2–1)
BUN SERPL-MCNC: 14 MG/DL (ref 5–25)
CALCIUM SERPL-MCNC: 8.8 MG/DL (ref 8.3–10.1)
CHLORIDE SERPL-SCNC: 105 MMOL/L (ref 100–108)
CHOLEST SERPL-MCNC: 152 MG/DL (ref 50–200)
CO2 SERPL-SCNC: 30 MMOL/L (ref 21–32)
CREAT SERPL-MCNC: 0.81 MG/DL (ref 0.6–1.3)
EOSINOPHIL # BLD AUTO: 0.15 THOUSAND/ΜL (ref 0–0.61)
EOSINOPHIL NFR BLD AUTO: 2 % (ref 0–6)
ERYTHROCYTE [DISTWIDTH] IN BLOOD BY AUTOMATED COUNT: 13.4 % (ref 11.6–15.1)
GFR SERPL CREATININE-BSD FRML MDRD: 82 ML/MIN/1.73SQ M
GLUCOSE P FAST SERPL-MCNC: 96 MG/DL (ref 65–99)
HCT VFR BLD AUTO: 40.4 % (ref 34.8–46.1)
HDLC SERPL-MCNC: 56 MG/DL
HGB BLD-MCNC: 12.9 G/DL (ref 11.5–15.4)
IMM GRANULOCYTES # BLD AUTO: 0.03 THOUSAND/UL (ref 0–0.2)
IMM GRANULOCYTES NFR BLD AUTO: 0 % (ref 0–2)
LDLC SERPL CALC-MCNC: 81 MG/DL (ref 0–100)
LYMPHOCYTES # BLD AUTO: 1.64 THOUSANDS/ΜL (ref 0.6–4.47)
LYMPHOCYTES NFR BLD AUTO: 24 % (ref 14–44)
MCH RBC QN AUTO: 29.7 PG (ref 26.8–34.3)
MCHC RBC AUTO-ENTMCNC: 31.9 G/DL (ref 31.4–37.4)
MCV RBC AUTO: 93 FL (ref 82–98)
MONOCYTES # BLD AUTO: 0.42 THOUSAND/ΜL (ref 0.17–1.22)
MONOCYTES NFR BLD AUTO: 6 % (ref 4–12)
NEUTROPHILS # BLD AUTO: 4.49 THOUSANDS/ΜL (ref 1.85–7.62)
NEUTS SEG NFR BLD AUTO: 68 % (ref 43–75)
NRBC BLD AUTO-RTO: 0 /100 WBCS
PLATELET # BLD AUTO: 259 THOUSANDS/UL (ref 149–390)
PMV BLD AUTO: 10 FL (ref 8.9–12.7)
POTASSIUM SERPL-SCNC: 4 MMOL/L (ref 3.5–5.3)
PROT SERPL-MCNC: 7.2 G/DL (ref 6.4–8.2)
RBC # BLD AUTO: 4.34 MILLION/UL (ref 3.81–5.12)
SODIUM SERPL-SCNC: 143 MMOL/L (ref 136–145)
TRIGL SERPL-MCNC: 73 MG/DL
WBC # BLD AUTO: 6.76 THOUSAND/UL (ref 4.31–10.16)

## 2021-06-19 PROCEDURE — 80053 COMPREHEN METABOLIC PANEL: CPT | Performed by: FAMILY MEDICINE

## 2021-06-19 PROCEDURE — 71046 X-RAY EXAM CHEST 2 VIEWS: CPT

## 2021-06-19 PROCEDURE — 85025 COMPLETE CBC W/AUTO DIFF WBC: CPT | Performed by: FAMILY MEDICINE

## 2021-06-19 PROCEDURE — 36415 COLL VENOUS BLD VENIPUNCTURE: CPT | Performed by: FAMILY MEDICINE

## 2021-06-19 PROCEDURE — 80061 LIPID PANEL: CPT

## 2021-06-30 ENCOUNTER — RA CDI HCC (OUTPATIENT)
Dept: OTHER | Facility: HOSPITAL | Age: 56
End: 2021-06-30

## 2021-06-30 NOTE — PROGRESS NOTES
[place] [address]  coding opportunities             Chart reviewed, (number of) suggestions sent to provider: 1     Problem listed updated  Provider Accepted, (number of) suggestions accepted: 1         Number of suggestions actually used: 0      Number of suggestions NOT actually used: 1     Patients insurance company: [employer] (Medicare Advantage and Commercial)     Visit status: Patient arrived for their scheduled appointment        [place] [address]  coding opportunities             Chart reviewed, (number of) suggestions sent to provider: 1     Problem listed updated   Provider Accepted, (number of) suggestions accepted: 1               Patients insurance company: [employer] (Medicare Advantage and Commercial)           [place] [address]  coding opportunities             Chart reviewed, (number of) suggestions sent to provider: 1                  Patients insurance company: [employer] (Medicare Advantage and Commercial)           Found on active problem list - please assess using MEAT for 2021 billing  E66 01 Obesity ([place] [address] )

## 2021-07-02 ENCOUNTER — VBI (OUTPATIENT)
Dept: ADMINISTRATIVE | Facility: OTHER | Age: 56
End: 2021-07-02

## 2021-07-07 ENCOUNTER — OFFICE VISIT (OUTPATIENT)
Dept: FAMILY MEDICINE CLINIC | Facility: CLINIC | Age: 56
End: 2021-07-07
Payer: COMMERCIAL

## 2021-07-07 VITALS
HEART RATE: 90 BPM | DIASTOLIC BLOOD PRESSURE: 76 MMHG | SYSTOLIC BLOOD PRESSURE: 104 MMHG | HEIGHT: 64 IN | RESPIRATION RATE: 16 BRPM | TEMPERATURE: 97.8 F | BODY MASS INDEX: 42.75 KG/M2 | WEIGHT: 250.4 LBS | OXYGEN SATURATION: 96 %

## 2021-07-07 DIAGNOSIS — M79.672 BILATERAL FOOT PAIN: ICD-10-CM

## 2021-07-07 DIAGNOSIS — Z00.00 ANNUAL PHYSICAL EXAM: Primary | ICD-10-CM

## 2021-07-07 DIAGNOSIS — M79.671 BILATERAL FOOT PAIN: ICD-10-CM

## 2021-07-07 PROBLEM — U07.1 COVID-19: Status: RESOLVED | Noted: 2021-01-21 | Resolved: 2021-07-07

## 2021-07-07 PROCEDURE — 99396 PREV VISIT EST AGE 40-64: CPT | Performed by: FAMILY MEDICINE

## 2021-07-07 PROCEDURE — 1036F TOBACCO NON-USER: CPT | Performed by: FAMILY MEDICINE

## 2021-07-07 PROCEDURE — 3008F BODY MASS INDEX DOCD: CPT | Performed by: FAMILY MEDICINE

## 2021-07-07 NOTE — PROGRESS NOTES
[address] [facility] [address]    NAME: Markie Garcia  AGE: 55 y o  SEX: female  : 1965     DATE: 2021     Assessment and Plan:     Problem List Items Addressed This Visit     None      Visit Diagnoses     Annual physical exam    -  Primary    Bilateral foot pain        Relevant Orders    Ambulatory referral to Orthopedic Surgery          Immunizations and preventive care screenings were discussed with patient today  Appropriate education was printed on patient's after visit summary  Counseling:  Alcohol/drug use: discussed moderation in alcohol intake, the recommendations for healthy alcohol use, and avoidance of illicit drug use  Dental Health: discussed importance of regular tooth brushing, flossing, and dental visits  Injury prevention: discussed safety/seat belts, safety helmets, smoke detectors, carbon dioxide detectors, and smoking near bedding or upholstery  Sexual health: discussed sexually transmitted diseases, partner selection, use of condoms, avoidance of unintended pregnancy, and contraceptive alternatives  · Exercise: the importance of regular exercise/physical activity was discussed  Recommend exercise 3-5 times per week for at least 30 minutes  BMI Counseling: Body mass index is 43 12 kg/m²  The BMI is above normal  Nutrition recommendations include decreasing portion sizes and encouraging healthy choices of fruits and vegetables  Exercise recommendations include moderate physical activity 150 minutes/week  No pharmacotherapy was ordered  No follow-ups on file  Chief Complaint:     Chief Complaint   Patient presents with    Physical Exam     Patient is here today for an annual exam       History of Present Illness:     Adult Annual Physical   Patient here for a comprehensive physical exam  The patient reports no problems  Diet and Physical Activity  · Diet/Nutrition: limited fruits/vegetables  · Exercise: walking  Depression Screening  PHQ-9 Depression Screening    PHQ-9:   Frequency of the following problems over the past two weeks:           General Health  · Sleep: sleeps well and gets 7-8 hours of sleep on average  · Hearing: normal - bilateral   · Vision: most recent eye exam <1 year ago and wears glasses  · Dental: regular dental visits and brushes teeth twice daily  /GYN Health  · Patient is: postmenopausal  · Last menstrual period: 3 years ago  · Contraceptive method: none  Review of Systems:     Review of Systems   Constitutional: Negative  HENT: Negative  Eyes: Negative  Respiratory: Negative  Cardiovascular: Negative  Gastrointestinal: Negative  Endocrine: Negative  Genitourinary: Negative  Musculoskeletal: Negative  Skin: Negative  Allergic/Immunologic: Negative  Neurological: Negative  Hematological: Negative  Psychiatric/Behavioral: Negative  Past Medical History:     Past Medical History:   Diagnosis Date    COVID-19 1/21/2021      Past Surgical History:     Past Surgical History:   Procedure Laterality Date    DILATION AND CURETTAGE OF UTERUS        Social History:     Social History     Socioeconomic History    Marital status: Single     Spouse name: None    Number of children: None    Years of education: None    Highest education level: None   Occupational History    None   Tobacco Use    Smoking status: Never Smoker    Smokeless tobacco: Never Used    Tobacco comment: former smoker quit 4 years ago    smoked for at least 20 years    half a pack to one pack of cigarettes a day   Substance and Sexual Activity    Alcohol use: No    Drug use: No    Sexual activity: Yes     Partners: Male   Other Topics Concern    None   Social History Narrative    None     Social Determinants of Health     Financial Resource Strain:     Difficulty of Paying Living Expenses:    Food Insecurity:     Worried About Running Out of Food in the Last Year:    [address] in the Last Year:    Transportation Needs:     Lack of Transportation (Medical):  Lack of Transportation (Non-Medical):    Physical Activity:     Days of Exercise per Week:     Minutes of Exercise per Session:    Stress:     Feeling of Stress :    Social Connections:     Frequency of Communication with Friends and Family:     Frequency of Social Gatherings with Friends and Family:     Attends Buddhist Services:     Active Member of Clubs or Organizations:     Attends Club or Organization Meetings:     Marital Status:    Intimate Partner Violence:     Fear of Current or Ex-Partner:     Emotionally Abused:     Physically Abused:     Sexually Abused:       Family History:     Family History   Problem Relation Age of Onset    Colon cancer Mother     Hypertension Mother     Alcohol abuse Father     Hypertension Sister     Colon cancer Paternal Aunt     Heart disease Family       Current Medications:     Current Outpatient Medications   Medication Sig Dispense Refill    albuterol (PROVENTIL HFA,VENTOLIN HFA) 90 mcg/act inhaler INHALE 1-2 PUFFS EVERY 6 (SIX) HOURS AS NEEDED FOR WHEEZING 1 Inhaler 5    ALPRAZolam (XANAX) 0 5 mg tablet Take 1 tablet (0 5 mg total) by mouth daily at bedtime as needed for anxiety 30 tablet 0    Cholecalciferol (VITAMIN D3) 2000 units capsule Take 10,000 Units by mouth Take one capsule by mouth daily   diclofenac sodium (VOLTAREN) 1 % Place on the skin      fluticasone (FLOVENT HFA) 110 MCG/ACT inhaler Inhale 1 puff 2 (two) times a day 1 Inhaler 2    OLIVE LEAF EXTRACT PO Take by mouth Take one capsule by mouth daily   Probiotic Product (PROBIOTIC PO) Take by mouth      fluticasone (FLONASE) 50 mcg/act nasal spray 1 spray into each nostril daily (Patient not taking: Reported on 1/27/2021) 16 g 0     No current facility-administered medications for this visit  Allergies:      Allergies   Allergen Reactions  Seasonal Ic  [Cholestatin]     Sulfamethoxazole-Trimethoprim      Other reaction(s): yeast infection  [facility] - [id number]: reaction is rash      Physical Exam:     /76 (BP Location: Left arm, Patient Position: Sitting, Cuff Size: Large)   Pulse 90   Temp 97 8 °F (36 6 °C) (Skin)   Resp 16   Ht 5' 3 9" (1 623 m)   Wt 114 kg (250 lb 6 4 oz)   SpO2 96%   BMI 43 12 kg/m²     Physical Exam  Vitals and nursing note reviewed  Constitutional:       General: She is not in acute distress  Appearance: She is well-developed  HENT:      Head: Normocephalic and atraumatic  Right Ear: Tympanic membrane normal       Left Ear: Tympanic membrane normal       Nose: Nose normal       Mouth/Throat:      Mouth: Mucous membranes are moist       Pharynx: No oropharyngeal exudate or posterior oropharyngeal erythema  Eyes:      Extraocular Movements: Extraocular movements intact  Conjunctiva/sclera: Conjunctivae normal       Pupils: Pupils are equal, round, and reactive to light  Cardiovascular:      Rate and Rhythm: Normal rate and regular rhythm  Heart sounds: No murmur heard  Pulmonary:      Effort: Pulmonary effort is normal  No respiratory distress  Breath sounds: Normal breath sounds  Abdominal:      Palpations: Abdomen is soft  Tenderness: There is no abdominal tenderness  Musculoskeletal:         General: Normal range of motion  Cervical back: Neck supple  Skin:     General: Skin is warm and dry  Capillary Refill: Capillary refill takes less than 2 seconds  Neurological:      General: No focal deficit present  Mental Status: She is alert and oriented to person, place, and time            Rupali Ramsey MD  [address]

## 2021-07-07 NOTE — PATIENT INSTRUCTIONS

## 2021-07-16 ENCOUNTER — HOSPITAL ENCOUNTER (OUTPATIENT)
Dept: MAMMOGRAPHY | Facility: MEDICAL CENTER | Age: 56
Discharge: HOME | End: 2021-07-16
Payer: COMMERCIAL

## 2021-07-16 VITALS — WEIGHT: 250 LBS | HEIGHT: 64 IN | BODY MASS INDEX: 42.68 KG/M2

## 2021-07-16 DIAGNOSIS — Z12.31 VISIT FOR SCREENING MAMMOGRAM: ICD-10-CM

## 2021-07-16 PROCEDURE — 77067 SCR MAMMO BI INCL CAD: CPT

## 2021-07-16 PROCEDURE — 77063 BREAST TOMOSYNTHESIS BI: CPT

## 2021-08-03 ENCOUNTER — OFFICE VISIT (OUTPATIENT)
Dept: FAMILY MEDICINE CLINIC | Facility: CLINIC | Age: 56
End: 2021-08-03
Payer: COMMERCIAL

## 2021-08-03 VITALS
BODY MASS INDEX: 43.33 KG/M2 | RESPIRATION RATE: 16 BRPM | HEART RATE: 93 BPM | WEIGHT: 253.8 LBS | DIASTOLIC BLOOD PRESSURE: 80 MMHG | SYSTOLIC BLOOD PRESSURE: 122 MMHG | HEIGHT: 64 IN | OXYGEN SATURATION: 97 % | TEMPERATURE: 98.8 F

## 2021-08-03 DIAGNOSIS — J45.20 MILD INTERMITTENT ASTHMA, UNSPECIFIED WHETHER COMPLICATED: ICD-10-CM

## 2021-08-03 DIAGNOSIS — E66.01 MORBID (SEVERE) OBESITY DUE TO EXCESS CALORIES (HCC): ICD-10-CM

## 2021-08-03 DIAGNOSIS — M54.50 CHRONIC BILATERAL LOW BACK PAIN WITHOUT SCIATICA: Primary | ICD-10-CM

## 2021-08-03 DIAGNOSIS — G89.29 CHRONIC BILATERAL LOW BACK PAIN WITHOUT SCIATICA: Primary | ICD-10-CM

## 2021-08-03 DIAGNOSIS — G43.009 MIGRAINE WITHOUT AURA AND WITHOUT STATUS MIGRAINOSUS, NOT INTRACTABLE: ICD-10-CM

## 2021-08-03 DIAGNOSIS — K21.9 GASTROESOPHAGEAL REFLUX DISEASE WITHOUT ESOPHAGITIS: ICD-10-CM

## 2021-08-03 PROCEDURE — 99214 OFFICE O/P EST MOD 30 MIN: CPT | Performed by: FAMILY MEDICINE

## 2021-08-03 PROCEDURE — 1036F TOBACCO NON-USER: CPT | Performed by: FAMILY MEDICINE

## 2021-08-03 PROCEDURE — 3008F BODY MASS INDEX DOCD: CPT | Performed by: FAMILY MEDICINE

## 2021-08-03 RX ORDER — CYCLOBENZAPRINE HCL 10 MG
10 TABLET ORAL
Qty: 30 TABLET | Refills: 0 | Status: SHIPPED | OUTPATIENT
Start: 2021-08-03 | End: 2022-04-14

## 2021-08-03 RX ORDER — MELOXICAM 15 MG/1
15 TABLET ORAL DAILY
Qty: 30 TABLET | Refills: 0 | Status: SHIPPED | OUTPATIENT
Start: 2021-08-03 | End: 2021-08-27

## 2021-08-03 NOTE — PROGRESS NOTES
Assessment/Plan:    No problem-specific Assessment & Plan notes found for this encounter  Diagnoses and all orders for this visit:    Chronic bilateral low back pain without sciatica  Comments:  she will continue chiropractor   Orders:  -     cyclobenzaprine (FLEXERIL) 10 mg tablet; Take 1 tablet (10 mg total) by mouth daily at bedtime  -     meloxicam (MOBIC) 15 mg tablet; Take 1 tablet (15 mg total) by mouth daily  -     Ambulatory referral to Physical Therapy; Future    Migraine without aura and without status migrainosus, not intractable  Comments:  stable  continue to monitor    Gastroesophageal reflux disease without esophagitis    Mild intermittent asthma, unspecified whether complicated  Comments:  not on flovent   doing well on current meds    Morbid (severe) obesity due to excess calories (HCC)  Comments:  diet and exercise encouraged           Subjective:      Patient ID: Es Velarde is a 55 y o  female  Woke up with low back pain radiating down to the legs for 1 week  No injury or trauma that she could recall  Was weeding at the backyard few days prior  hasnt tried any meds for it but seen chiropractor for this which is helping     Back Pain  This is a new problem  The current episode started 1 to 4 weeks ago  The pain is present in the lumbar spine  The pain is at a severity of 3/10  The pain is worse during the day  The symptoms are aggravated by bending and twisting  Pertinent negatives include no abdominal pain, bladder incontinence, bowel incontinence, chest pain, dysuria, fever, headaches, leg pain, numbness, paresis, paresthesias, pelvic pain, perianal numbness, tingling, weakness or weight loss  She has tried chiropractic manipulation for the symptoms  The treatment provided mild relief         The following portions of the patient's history were reviewed and updated as appropriate: allergies, current medications, past family history, past medical history, past social history, past surgical history and problem list     Review of Systems   Constitutional: Negative for fever and weight loss  Cardiovascular: Negative for chest pain  Gastrointestinal: Negative for abdominal pain and bowel incontinence  Genitourinary: Negative for bladder incontinence, dysuria and pelvic pain  Musculoskeletal: Positive for back pain  Neurological: Negative for tingling, weakness, numbness, headaches and paresthesias  Objective:      /80   Pulse 93   Temp 98 8 °F (37 1 °C) (Tympanic)   Resp 16   Ht 5' 4" (1 626 m)   Wt 115 kg (253 lb 12 8 oz)   SpO2 97%   BMI 43 56 kg/m²          Physical Exam  Constitutional:       Appearance: She is obese  Cardiovascular:      Rate and Rhythm: Normal rate and regular rhythm  Pulses: Normal pulses  Heart sounds: Normal heart sounds  Pulmonary:      Effort: Pulmonary effort is normal       Breath sounds: Normal breath sounds  Musculoskeletal:         General: Tenderness present  No signs of injury  Left lower leg: No edema  Comments: Low lumbar   Skin:     Capillary Refill: Capillary refill takes less than 2 seconds  Neurological:      General: No focal deficit present  Mental Status: She is alert and oriented to person, place, and time     Psychiatric:         Mood and Affect: Mood normal

## 2021-08-06 ENCOUNTER — HOSPITAL ENCOUNTER (OUTPATIENT)
Dept: MAMMOGRAPHY | Facility: CLINIC | Age: 56
Discharge: HOME | End: 2021-08-06
Payer: COMMERCIAL

## 2021-08-06 ENCOUNTER — HOSPITAL ENCOUNTER (OUTPATIENT)
Dept: ULTRASOUND IMAGING | Facility: CLINIC | Age: 56
Discharge: HOME | End: 2021-08-06
Payer: COMMERCIAL

## 2021-08-06 VITALS — HEIGHT: 64 IN | WEIGHT: 253 LBS | BODY MASS INDEX: 43.19 KG/M2

## 2021-08-06 DIAGNOSIS — R92.8 ABNORMAL SCREENING MAMMOGRAM: ICD-10-CM

## 2021-08-06 PROCEDURE — 77065 DX MAMMO INCL CAD UNI: CPT

## 2021-08-06 PROCEDURE — G0279 TOMOSYNTHESIS, MAMMO: HCPCS

## 2021-08-06 PROCEDURE — 76642 ULTRASOUND BREAST LIMITED: CPT

## 2021-08-11 ENCOUNTER — VBI (OUTPATIENT)
Dept: ADMINISTRATIVE | Facility: OTHER | Age: 56
End: 2021-08-11

## 2021-08-11 NOTE — TELEPHONE ENCOUNTER
08/11/21 10:22 AM     See documentation in the [employer] SmartForm     Chart notes state hysterectomy, no supporting documentation  Aleksandra Delacruz MA

## 2021-08-27 DIAGNOSIS — M54.50 CHRONIC BILATERAL LOW BACK PAIN WITHOUT SCIATICA: ICD-10-CM

## 2021-08-27 DIAGNOSIS — G89.29 CHRONIC BILATERAL LOW BACK PAIN WITHOUT SCIATICA: ICD-10-CM

## 2021-08-27 RX ORDER — MELOXICAM 15 MG/1
TABLET ORAL
Qty: 30 TABLET | Refills: 0 | Status: SHIPPED | OUTPATIENT
Start: 2021-08-27 | End: 2022-05-19

## 2021-09-07 ENCOUNTER — TELEPHONE (OUTPATIENT)
Dept: FAMILY MEDICINE CLINIC | Facility: CLINIC | Age: 56
End: 2021-09-07

## 2021-09-07 DIAGNOSIS — K21.9 GASTROESOPHAGEAL REFLUX DISEASE WITHOUT ESOPHAGITIS: Primary | ICD-10-CM

## 2021-09-07 RX ORDER — PANTOPRAZOLE SODIUM 40 MG/1
40 TABLET, DELAYED RELEASE ORAL DAILY
Qty: 30 TABLET | Refills: 2 | Status: SHIPPED | OUTPATIENT
Start: 2021-09-07 | End: 2022-07-11 | Stop reason: SDUPTHER

## 2021-09-07 RX ORDER — PANTOPRAZOLE SODIUM 40 MG/1
40 TABLET, DELAYED RELEASE ORAL DAILY
COMMUNITY
End: 2021-09-07 | Stop reason: SDUPTHER

## 2021-09-07 NOTE — TELEPHONE ENCOUNTER
Hi Dr Roni Ramírez,    Hoping you can help me  This pt called (pt of Dr Charleen Goodson) and she is requesting a refill for her reflux med  She could not remember the name of it  The only thing I see in her history is pantoprazole, but I can't see diagnosis as Ela Tate explained it came over from your old system       Is there another way I can see what medication she is looking for or if it is the pantoprazole?      Thank you in advance

## 2021-09-07 NOTE — TELEPHONE ENCOUNTER
Medication:pantoprazole (PROTONIX) 40 mg tablet  How Often:  Sig: Take 1 tablet by mouth daily           Quantity:  30  Last Office Visit: 08/03/2021  Next Office Visit:  Last refilled:   How many pills left:  Pharmacy:   [facility] [address]  Phone: [phone] Fax: [phone]        Pt stated she wanted a refill for her reflux medication, but she hasn't had it in a while

## 2021-09-14 ENCOUNTER — TELEPHONE (OUTPATIENT)
Dept: FAMILY MEDICINE CLINIC | Facility: CLINIC | Age: 56
End: 2021-09-14

## 2021-09-14 NOTE — TELEPHONE ENCOUNTER
Pt called she has been out of work since yesterday and today  Pt will need a note to go back  Pt states its acid reflux and kept her up all night throwing up  You have no appt anytime soon   Can patient have a note pls advise    Or do you wanna squeeze her in    Thank you

## 2021-09-14 NOTE — TELEPHONE ENCOUNTER
Problem: Non-Pressure Injury Wound  Goal: # No deterioration in wound  Outcome: Outcome Met, Continue evaluating goal progress toward completion  Seen in wound clinic for provider appointment , wounds appear stable, no sign/symptoms of infection. Therapy plan to include: acticoat flex to open areas, lotion to dry intact skin, low compression tetra . Patient aware to contact wound clinic with any questions or concerns. Follow up in 3-4 weeks with provider, Wound Care RN to complete dressing change 1/wk in clinic, family to change 2x/wk.         She needs to see GI for this ASAP  Referral in chart  Please give her number to call  She can have a note   If she is throwing up and cant keep anything down- she needs to go to ER

## 2021-09-29 ENCOUNTER — TELEPHONE (OUTPATIENT)
Dept: FAMILY MEDICINE CLINIC | Facility: CLINIC | Age: 56
End: 2021-09-29

## 2021-09-29 NOTE — TELEPHONE ENCOUNTER
Patient called and stated that at her job they make them do stretches and there are some that she can not do and she said they are forcing her to do, she wanted to know if she can have a note that states she she can only do the exercises that she can do and for the ones she can't do she doesn't have to do them   Please advise

## 2021-10-13 ENCOUNTER — VBI (OUTPATIENT)
Dept: ADMINISTRATIVE | Facility: OTHER | Age: 56
End: 2021-10-13

## 2021-10-15 ENCOUNTER — TELEPHONE (OUTPATIENT)
Dept: FAMILY MEDICINE CLINIC | Facility: CLINIC | Age: 56
End: 2021-10-15

## 2021-12-27 ENCOUNTER — TELEPHONE (OUTPATIENT)
Dept: FAMILY MEDICINE CLINIC | Facility: CLINIC | Age: 56
End: 2021-12-27

## 2021-12-27 ENCOUNTER — NURSE TRIAGE (OUTPATIENT)
Dept: OTHER | Facility: OTHER | Age: 56
End: 2021-12-27

## 2021-12-27 DIAGNOSIS — B34.9 VIRAL ILLNESS: Primary | ICD-10-CM

## 2021-12-27 PROCEDURE — U0003 INFECTIOUS AGENT DETECTION BY NUCLEIC ACID (DNA OR RNA); SEVERE ACUTE RESPIRATORY SYNDROME CORONAVIRUS 2 (SARS-COV-2) (CORONAVIRUS DISEASE [COVID-19]), AMPLIFIED PROBE TECHNIQUE, MAKING USE OF HIGH THROUGHPUT TECHNOLOGIES AS DESCRIBED BY CMS-2020-01-R: HCPCS | Performed by: FAMILY MEDICINE

## 2021-12-27 PROCEDURE — U0005 INFEC AGEN DETEC AMPLI PROBE: HCPCS | Performed by: FAMILY MEDICINE

## 2021-12-28 LAB — SARS-COV-2 RNA RESP QL NAA+PROBE: NEGATIVE

## 2021-12-30 ENCOUNTER — TELEPHONE (OUTPATIENT)
Dept: FAMILY MEDICINE CLINIC | Facility: CLINIC | Age: 56
End: 2021-12-30

## 2022-04-11 ENCOUNTER — VBI (OUTPATIENT)
Dept: ADMINISTRATIVE | Facility: OTHER | Age: 57
End: 2022-04-11

## 2022-04-11 ENCOUNTER — TELEPHONE (OUTPATIENT)
Dept: FAMILY MEDICINE CLINIC | Facility: CLINIC | Age: 57
End: 2022-04-11

## 2022-04-11 NOTE — TELEPHONE ENCOUNTER
Patient called and stated that she was out of work today due to a migraine, she wanted to know if she can have a note for today, as we haven't received her [place] paperwork to update   Please advise

## 2022-05-19 ENCOUNTER — TELEMEDICINE (OUTPATIENT)
Dept: FAMILY MEDICINE CLINIC | Facility: CLINIC | Age: 57
End: 2022-05-19
Payer: COMMERCIAL

## 2022-05-19 DIAGNOSIS — J02.9 PHARYNGITIS, UNSPECIFIED ETIOLOGY: ICD-10-CM

## 2022-05-19 DIAGNOSIS — B34.9 ACUTE VIRAL SYNDROME: Primary | ICD-10-CM

## 2022-05-19 PROCEDURE — 1036F TOBACCO NON-USER: CPT | Performed by: FAMILY MEDICINE

## 2022-05-19 PROCEDURE — 87636 SARSCOV2 & INF A&B AMP PRB: CPT | Performed by: FAMILY MEDICINE

## 2022-05-19 PROCEDURE — 99213 OFFICE O/P EST LOW 20 MIN: CPT | Performed by: FAMILY MEDICINE

## 2022-05-19 PROCEDURE — 3725F SCREEN DEPRESSION PERFORMED: CPT | Performed by: FAMILY MEDICINE

## 2022-05-19 RX ORDER — FLUTICASONE PROPIONATE 50 MCG
2 SPRAY, SUSPENSION (ML) NASAL DAILY
Qty: 9.9 ML | Refills: 0 | Status: SHIPPED | OUTPATIENT
Start: 2022-05-19 | End: 2022-06-15

## 2022-05-19 RX ORDER — AMOXICILLIN 500 MG/1
500 CAPSULE ORAL EVERY 12 HOURS SCHEDULED
Qty: 30 CAPSULE | Refills: 0 | Status: SHIPPED | OUTPATIENT
Start: 2022-05-19 | End: 2022-05-29

## 2022-05-19 NOTE — PROGRESS NOTES
Virtual Regular Visit    Verification of patient location:    Patient is located in the following state in which I hold an active license [place]      Assessment/Plan:    Problem List Items Addressed This Visit        Other    Acute viral syndrome - Primary     Nasal congestion, sore throat, ear pain, fatigue, and body ache for 4 days   Painful to swallow but is trying to stay hydrated   Multiple sick contacts but none with COVID   Throat erythematous but no exudate  Patient also appears stable and does not have any symptoms that would suggest asthma exacerbation   Asked to come in for testing   Had COVID in Jan 2021  If negative, start amoxicillin for presumed strept infection            Relevant Medications    fluticasone (FLONASE) 50 mcg/act nasal spray    Other Relevant Orders    Covid/Flu- Office Collect      Other Visit Diagnoses     Pharyngitis, unspecified etiology        Relevant Medications    amoxicillin (AMOXIL) 500 mg capsule            Depression Screening and Follow-up Plan: Patient was screened for depression during today's encounter  They screened negative with a PHQ-2 score of 0  Reason for visit is   Chief Complaint   Patient presents with    Nasal Congestion     Patient being seen for congestion x 4 days    Sore Throat     Patient being seen for sore throat x 4 days        Encounter provider Gatito Phillips MD    Provider located at [place] [address] [place] [id number]      Recent Visits  No visits were found meeting these conditions  Showing recent visits within past 7 days and meeting all other requirements  Today's Visits  Date Type Provider Dept   05/19/22 Telemedicine MD James Fung today's visits and meeting all other requirements  Future Appointments  No visits were found meeting these conditions    Showing future appointments within next 150 days and meeting all other requirements       The patient was identified by name and date of birth  Adalgisa Hinson was informed that this is a telemedicine visit and that the visit is being conducted through [address] [facility] and patient was informed that this is a secure, HIPAA-compliant platform  She agrees to proceed     My office door was closed  No one else was in the room  She acknowledged consent and understanding of privacy and security of the video platform  The patient has agreed to participate and understands they can discontinue the visit at any time  Patient is aware this is a billable service  Subjective  Adalgisa Hinson is a 65 y o  female seen virtually with sore throat and nasal congestion  It started 4 days ago  States her throat feels raw  Daughter and grandson are both sick but have tested negative for COVID  Sore throat is radiating to the left ear but denies drainage  Had [place] last January  Past Medical History:   Diagnosis Date    COVID-19 1/21/2021       Past Surgical History:   Procedure Laterality Date    DILATION AND CURETTAGE OF UTERUS         Current Outpatient Medications   Medication Sig Dispense Refill    albuterol (PROVENTIL HFA,VENTOLIN HFA) 90 mcg/act inhaler INHALE 1-2 PUFFS EVERY 6 (SIX) HOURS AS NEEDED FOR WHEEZING 1 Inhaler 5    ALPRAZolam (XANAX) 0 5 mg tablet Take 1 tablet (0 5 mg total) by mouth daily at bedtime as needed for anxiety 30 tablet 0    amoxicillin (AMOXIL) 500 mg capsule Take 1 capsule (500 mg total) by mouth every 12 (twelve) hours for 10 days 30 capsule 0    Cholecalciferol (VITAMIN D3) 2000 units capsule Take 10,000 Units by mouth Take one capsule by mouth daily   diclofenac sodium (VOLTAREN) 1 % Place on the skin      fluticasone (FLONASE) 50 mcg/act nasal spray 2 sprays into each nostril in the morning  9 9 mL 0    fluticasone (FLOVENT HFA) 110 MCG/ACT inhaler Inhale 1 puff 2 (two) times a day 1 Inhaler 2    OLIVE LEAF EXTRACT PO Take by mouth Take one capsule by mouth daily        pantoprazole (PROTONIX) 40 mg tablet Take 1 tablet (40 mg total) by mouth daily 30 tablet 2    Probiotic Product (PROBIOTIC PO) Take by mouth       No current facility-administered medications for this visit  Allergies   Allergen Reactions    Seasonal Ic  [Cholestatin]     Sulfamethoxazole-Trimethoprim      Other reaction(s): yeast infection  [facility] - [id number]: reaction is rash       Review of Systems   Constitutional: Negative for fever  HENT: Positive for congestion, ear pain, postnasal drip and sore throat  Respiratory: Negative for cough, chest tightness, shortness of breath and wheezing  Burning in the chest but improved with dayquil and nyquil   Cardiovascular: Positive for chest pain (burning )  Negative for palpitations  Gastrointestinal: Positive for diarrhea (briefly ) and nausea  Musculoskeletal: Positive for myalgias (but resolved )  Skin: Negative for rash  Video Exam    Vitals:       Physical Exam  Constitutional:       General: She is not in acute distress  Appearance: She is well-developed  She is not ill-appearing  HENT:      Head: Normocephalic and atraumatic  Nose: Congestion present  Mouth/Throat:      Pharynx: Posterior oropharyngeal erythema present  Tonsils: No tonsillar exudate  1+ on the right  1+ on the left  Eyes:      Extraocular Movements:      Right eye: Normal extraocular motion  Left eye: Normal extraocular motion  Pulmonary:      Effort: Pulmonary effort is normal  No respiratory distress  Skin:     Coloration: Skin is not pale  Neurological:      Mental Status: She is oriented to person, place, and time  Psychiatric:         Mood and Affect: Mood normal          Behavior: Behavior normal           I spent 10 minutes directly with the patient during this visit    VIRTUAL VISIT [address] White verbally agrees to participate in [employer]   Pt is aware that [employer] could be limited without vital signs or the ability to perform a full hands-on physical exam  Riana Lawson understands she or the provider may request at any time to terminate the video visit and request the patient to seek care or treatment in person

## 2022-05-19 NOTE — ASSESSMENT & PLAN NOTE
Nasal congestion, sore throat, ear pain, fatigue, and body ache for 4 days   Painful to swallow but is trying to stay hydrated   Multiple sick contacts but none with COVID   Throat erythematous but no exudate  Patient also appears stable and does not have any symptoms that would suggest asthma exacerbation   Asked to come in for testing   Had COVID in Jan 2021  If negative, start amoxicillin for presumed strept infection

## 2022-05-20 LAB
FLUAV RNA RESP QL NAA+PROBE: NEGATIVE
FLUBV RNA RESP QL NAA+PROBE: NEGATIVE
SARS-COV-2 RNA RESP QL NAA+PROBE: NEGATIVE

## 2022-05-23 ENCOUNTER — TELEPHONE (OUTPATIENT)
Dept: FAMILY MEDICINE CLINIC | Facility: CLINIC | Age: 57
End: 2022-05-23

## 2022-05-23 DIAGNOSIS — B34.9 ACUTE VIRAL SYNDROME: Primary | ICD-10-CM

## 2022-05-23 RX ORDER — LIDOCAINE HYDROCHLORIDE 20 MG/ML
15 SOLUTION OROPHARYNGEAL 2 TIMES DAILY PRN
Qty: 100 ML | Refills: 1 | Status: SHIPPED | OUTPATIENT
Start: 2022-05-23 | End: 2022-07-11

## 2022-05-23 NOTE — TELEPHONE ENCOUNTER
Throat still bothering her despite 4 days of amoxicillin  Throat still feels raw  Ear are also bothering her  No drainage from the ears  Painful to swallow  Been doing tea with honey which helped  Asked to continue abx and call on Thursday if symptoms don't improve

## 2022-05-23 NOTE — TELEPHONE ENCOUNTER
Patient called and was given results, she stated that she has been taking the antibiotic and its not helping,     She stated that her throat feels raw, has a headache, and both ears hurt   She also wants to know if she is contagious?:       Please advise

## 2022-05-23 NOTE — TELEPHONE ENCOUNTER
Patient called back to check on the status of her message from this morning  She also stated that her spouse is starting to get the same symptoms and wanted to know if there was anyway they can have something sent for him as well

## 2022-05-26 ENCOUNTER — TELEPHONE (OUTPATIENT)
Dept: FAMILY MEDICINE CLINIC | Facility: CLINIC | Age: 57
End: 2022-05-26

## 2022-05-26 NOTE — TELEPHONE ENCOUNTER
Patient called and was asking for a note for work to be excuse 5/20 through 5/30 and return 5/31  Is it okay to write the letter?

## 2022-06-15 DIAGNOSIS — B34.9 ACUTE VIRAL SYNDROME: ICD-10-CM

## 2022-06-15 RX ORDER — FLUTICASONE PROPIONATE 50 MCG
2 SPRAY, SUSPENSION (ML) NASAL DAILY
Qty: 16 ML | Refills: 0 | Status: SHIPPED | OUTPATIENT
Start: 2022-06-15 | End: 2022-07-11

## 2022-06-16 ENCOUNTER — VBI (OUTPATIENT)
Dept: ADMINISTRATIVE | Facility: OTHER | Age: 57
End: 2022-06-16

## 2022-06-23 ENCOUNTER — RA CDI HCC (OUTPATIENT)
Dept: OTHER | Facility: HOSPITAL | Age: 57
End: 2022-06-23

## 2022-06-23 NOTE — PROGRESS NOTES
The following dx found on active problem list - please assess using MEAT for 2022 billing     E66 01: Morbid (severe) obesity due to excess calories ([facility] [address] ) -      Per CMS/ICD 10 coding guidelines, BMI of 40 or higher; Class 3 obesity is sometimes categorized as "morbid," "extreme," or "severe" obesity      [address]  coding opportunities          Chart Reviewed number of suggestions sent to Provider: 1     Patients Insurance        Commercial Insurance: [employer]

## 2022-07-11 ENCOUNTER — OFFICE VISIT (OUTPATIENT)
Dept: FAMILY MEDICINE CLINIC | Facility: CLINIC | Age: 57
End: 2022-07-11
Payer: COMMERCIAL

## 2022-07-11 VITALS
DIASTOLIC BLOOD PRESSURE: 78 MMHG | RESPIRATION RATE: 16 BRPM | SYSTOLIC BLOOD PRESSURE: 112 MMHG | BODY MASS INDEX: 46.42 KG/M2 | OXYGEN SATURATION: 97 % | TEMPERATURE: 97.5 F | HEIGHT: 63 IN | HEART RATE: 99 BPM | WEIGHT: 262 LBS

## 2022-07-11 DIAGNOSIS — Z11.59 NEED FOR HEPATITIS C SCREENING TEST: ICD-10-CM

## 2022-07-11 DIAGNOSIS — J45.20 MILD INTERMITTENT ASTHMA, UNSPECIFIED WHETHER COMPLICATED: ICD-10-CM

## 2022-07-11 DIAGNOSIS — G89.4 CHRONIC PAIN SYNDROME: ICD-10-CM

## 2022-07-11 DIAGNOSIS — E66.01 MORBID (SEVERE) OBESITY DUE TO EXCESS CALORIES (HCC): ICD-10-CM

## 2022-07-11 DIAGNOSIS — Z12.11 COLON CANCER SCREENING: ICD-10-CM

## 2022-07-11 DIAGNOSIS — Z23 PNEUMOCOCCAL VACCINATION GIVEN: ICD-10-CM

## 2022-07-11 DIAGNOSIS — M17.0 ARTHRITIS OF BOTH KNEES: ICD-10-CM

## 2022-07-11 DIAGNOSIS — Z11.4 SCREENING FOR HIV (HUMAN IMMUNODEFICIENCY VIRUS): Primary | ICD-10-CM

## 2022-07-11 DIAGNOSIS — K21.9 GASTROESOPHAGEAL REFLUX DISEASE WITHOUT ESOPHAGITIS: ICD-10-CM

## 2022-07-11 DIAGNOSIS — Z00.00 ANNUAL PHYSICAL EXAM: ICD-10-CM

## 2022-07-11 PROBLEM — B34.9 ACUTE VIRAL SYNDROME: Status: RESOLVED | Noted: 2022-05-19 | Resolved: 2022-07-11

## 2022-07-11 PROCEDURE — 99396 PREV VISIT EST AGE 40-64: CPT | Performed by: FAMILY MEDICINE

## 2022-07-11 PROCEDURE — 90471 IMMUNIZATION ADMIN: CPT

## 2022-07-11 PROCEDURE — 90677 PCV20 VACCINE IM: CPT

## 2022-07-11 RX ORDER — PANTOPRAZOLE SODIUM 40 MG/1
40 TABLET, DELAYED RELEASE ORAL DAILY
Qty: 30 TABLET | Refills: 2 | Status: SHIPPED | OUTPATIENT
Start: 2022-07-11 | End: 2022-09-23 | Stop reason: SDUPTHER

## 2022-07-11 NOTE — PATIENT INSTRUCTIONS

## 2022-08-16 ENCOUNTER — VBI (OUTPATIENT)
Dept: ADMINISTRATIVE | Facility: OTHER | Age: 57
End: 2022-08-16

## 2022-09-22 ENCOUNTER — VBI (OUTPATIENT)
Dept: ADMINISTRATIVE | Facility: OTHER | Age: 57
End: 2022-09-22

## 2022-09-23 ENCOUNTER — OFFICE VISIT (OUTPATIENT)
Dept: FAMILY MEDICINE CLINIC | Facility: CLINIC | Age: 57
End: 2022-09-23
Payer: COMMERCIAL

## 2022-09-23 VITALS
TEMPERATURE: 98.1 F | HEART RATE: 90 BPM | BODY MASS INDEX: 46.25 KG/M2 | HEIGHT: 63 IN | DIASTOLIC BLOOD PRESSURE: 84 MMHG | SYSTOLIC BLOOD PRESSURE: 130 MMHG | RESPIRATION RATE: 16 BRPM | OXYGEN SATURATION: 97 % | WEIGHT: 261 LBS

## 2022-09-23 DIAGNOSIS — K21.9 GASTROESOPHAGEAL REFLUX DISEASE WITHOUT ESOPHAGITIS: ICD-10-CM

## 2022-09-23 DIAGNOSIS — J45.20 MILD INTERMITTENT ASTHMA WITHOUT COMPLICATION: ICD-10-CM

## 2022-09-23 DIAGNOSIS — E66.01 MORBID (SEVERE) OBESITY DUE TO EXCESS CALORIES (HCC): ICD-10-CM

## 2022-09-23 DIAGNOSIS — J45.20 MILD INTERMITTENT ASTHMA, UNSPECIFIED WHETHER COMPLICATED: Primary | ICD-10-CM

## 2022-09-23 PROCEDURE — 99214 OFFICE O/P EST MOD 30 MIN: CPT | Performed by: FAMILY MEDICINE

## 2022-09-23 RX ORDER — AZITHROMYCIN 250 MG/1
TABLET, FILM COATED ORAL
Qty: 6 TABLET | Refills: 0 | Status: SHIPPED | OUTPATIENT
Start: 2022-09-23 | End: 2022-09-27

## 2022-09-23 RX ORDER — PREDNISONE 10 MG/1
TABLET ORAL
Qty: 20 TABLET | Refills: 0 | Status: SHIPPED | OUTPATIENT
Start: 2022-09-23

## 2022-09-23 RX ORDER — PANTOPRAZOLE SODIUM 40 MG/1
40 TABLET, DELAYED RELEASE ORAL DAILY
Qty: 30 TABLET | Refills: 2 | Status: SHIPPED | OUTPATIENT
Start: 2022-09-23

## 2022-09-23 RX ORDER — FLUTICASONE PROPIONATE 110 UG/1
1 AEROSOL, METERED RESPIRATORY (INHALATION) 2 TIMES DAILY
Qty: 12 G | Refills: 0 | Status: SHIPPED | OUTPATIENT
Start: 2022-09-23 | End: 2022-10-20

## 2022-09-23 NOTE — PROGRESS NOTES
Name: Carla Beavers      : 1965      MRN: [id number]  Encounter Provider: Sivakumar Townsend MD  Encounter Date: 2022   Encounter department: [facility]     1  Mild intermittent asthma, unspecified whether complicated  Assessment & Plan:  Worsening since cold symptoms   zpack and Prednisone taper as directed      2  Gastroesophageal reflux disease without esophagitis  Assessment & Plan: To restart her pantoprazole again as acid reflux is making her cough worse    Orders:  -     pantoprazole (PROTONIX) 40 mg tablet; Take 1 tablet (40 mg total) by mouth daily    3  Morbid (severe) obesity due to excess calories [facility])  Assessment & Plan:  Diet and exercise encouraged      4  Mild intermittent asthma without complication  Comments:  to restart her flovent twice a day   to see pulmonary  Assessment & Plan:  Worsening since cold symptoms   zpack and Prednisone taper as directed    Orders:  -     fluticasone (FLOVENT HFA) 110 MCG/ACT inhaler; Inhale 1 puff 2 (two) times a day  -     azithromycin (ZITHROMAX) 250 mg tablet; Take 2 tablets today then 1 tablet daily x 4 days  -     predniSONE 10 mg tablet; 4 tabs x 2 days, 3 tabs x 2 days, 2 tabs x 2 days, 1 tab x 2 days           Subjective    started to have cough and viral symptoms 2 weeks ago   Shortness of breath and wheezing continues since then       Shortness of Breath  Episode onset: 2 weeks ago  The problem has been waxing and waning since onset  Associated symptoms include chest pressure, coughing and wheezing  Pertinent negatives include no chest pain, dizziness, fatigue, hoarseness of voice, leg swelling, orthopnea, palpitations, rhinorrhea, sore throat, stridor or sweats  Her past medical history is significant for asthma  There is no history of bronchiolitis  Review of Systems   Constitutional: Negative for fatigue  HENT: Negative for hoarse voice, rhinorrhea and sore throat      Respiratory: Positive for cough, shortness of breath and wheezing  Negative for stridor  Cardiovascular: Negative for chest pain, palpitations, orthopnea and leg swelling  Neurological: Negative for dizziness  Current Outpatient Medications on File Prior to Visit   Medication Sig    albuterol (PROVENTIL HFA,VENTOLIN HFA) 90 mcg/act inhaler INHALE 1-2 PUFFS EVERY 6 (SIX) HOURS AS NEEDED FOR WHEEZING    ALPRAZolam (XANAX) 0 5 mg tablet Take 1 tablet (0 5 mg total) by mouth daily at bedtime as needed for anxiety    Cholecalciferol (VITAMIN D3) 2000 units capsule Take 10,000 Units by mouth Take one capsule by mouth daily   diclofenac sodium (VOLTAREN) 1 % Place on the skin    OLIVE LEAF EXTRACT PO Take by mouth Take one capsule by mouth daily   Probiotic Product (PROBIOTIC PO) Take by mouth    [DISCONTINUED] pantoprazole (PROTONIX) 40 mg tablet Take 1 tablet (40 mg total) by mouth daily    [DISCONTINUED] fluticasone (FLOVENT HFA) 110 MCG/ACT inhaler Inhale 1 puff 2 (two) times a day (Patient not taking: No sig reported)       Objective     /84 (BP Location: Left arm, Patient Position: Sitting, Cuff Size: Large)   Pulse 90   Temp 98 1 °F (36 7 °C) (Tympanic)   Resp 16   Ht 5' 3 27" (1 607 m)   Wt 118 kg (261 lb)   SpO2 97%   BMI 45 84 kg/m²     Physical Exam  Constitutional:       Appearance: She is well-developed  Eyes:      Extraocular Movements: Extraocular movements intact  Pupils: Pupils are equal, round, and reactive to light  Cardiovascular:      Rate and Rhythm: Normal rate and regular rhythm  Pulmonary:      Effort: Pulmonary effort is normal       Breath sounds: Normal breath sounds  Chest:      Chest wall: No mass  Neurological:      Mental Status: She is alert     Psychiatric:         Mood and Affect: Mood normal          Behavior: Behavior normal        Ramona Burger MD

## 2022-09-28 ENCOUNTER — TELEPHONE (OUTPATIENT)
Dept: FAMILY MEDICINE CLINIC | Facility: CLINIC | Age: 57
End: 2022-09-28

## 2022-09-28 NOTE — TELEPHONE ENCOUNTER
Forms from insurance company being faxed over   Please call pt when they come in so she can pick them up and fill out her part before they get to Dr Digna West

## 2022-10-03 ENCOUNTER — TELEPHONE (OUTPATIENT)
Dept: FAMILY MEDICINE CLINIC | Facility: CLINIC | Age: 57
End: 2022-10-03

## 2022-10-03 NOTE — TELEPHONE ENCOUNTER
Received blank FML[facility]/ [facility] of [facility]  No detailed information and the the Pt info section is not completed  Left message for Pt to provide the information needed for completion by provider or come to the office to complete the patient portion of the form       Blank copy scanned and in front drawer (in case Pt comes to the office)

## 2022-10-19 NOTE — PROGRESS NOTES
[address]    NAME: Moiz Galeana  AGE: 65 y o  SEX: female  : 1965     DATE: 2022     Assessment and Plan:     Problem List Items Addressed This Visit        Digestive    Esophageal reflux     She takes it as needed  To avoid triggers           Relevant Medications    pantoprazole (PROTONIX) 40 mg tablet       Respiratory    Asthma     Stable  Continue current meds               Musculoskeletal and Integument    Arthritis of both knees     Weight loss will help it            Relevant Orders    Ambulatory Referral to Pain Management       Other    Morbid (severe) obesity due to excess calories (HCC)     Diet and exercise encouraged            Relevant Orders    Ambulatory Referral to Weight Management    Chronic pain syndrome     Hurts from neck down   Had injections in the past which didn't help             Relevant Orders    Ambulatory Referral to Pain Management      Other Visit Diagnoses     Screening for HIV (human immunodeficiency virus)    -  Primary    Relevant Orders    HIV 1/2 Antigen/Antibody (4th Generation) w Reflex [facility]    Need for hepatitis C screening test        Relevant Orders    Hepatitis C Antibody ([facility])    Annual physical exam        Relevant Orders    Comprehensive metabolic panel    CBC and differential    Lipid Panel with Direct LDL reflex    TSH, 3rd generation with Free T4 reflex    Ambulatory Referral to Obstetrics / Gynecology    Colon cancer screening        Relevant Orders    Cologuard    Pneumococcal vaccination given        Relevant Orders    Pneumococcal Conjugate Vaccine 20-valent (Pcv20)          Immunizations and preventive care screenings were discussed with patient today  Appropriate education was printed on patient's after visit summary      Counseling:  Alcohol/drug use: discussed moderation in alcohol intake, the recommendations for healthy alcohol use, and avoidance of illicit drug use  Dental Health: discussed importance of regular tooth brushing, flossing, and dental visits  Injury prevention: discussed safety/seat belts, safety helmets, smoke detectors, carbon dioxide detectors, and smoking near bedding or upholstery  Sexual health: discussed sexually transmitted diseases, partner selection, use of condoms, avoidance of unintended pregnancy, and contraceptive alternatives  Exercise: the importance of regular exercise/physical activity was discussed  Recommend exercise 3-5 times per week for at least 30 minutes  BMI Counseling: Body mass index is 46 02 kg/m²  The BMI is above normal  Nutrition recommendations include decreasing portion sizes and encouraging healthy choices of fruits and vegetables  Exercise recommendations include moderate physical activity 150 minutes/week  No pharmacotherapy was ordered  Rationale for BMI follow-up plan is due to patient being overweight or obese  No follow-ups on file  Chief Complaint:     Chief Complaint   Patient presents with    Physical Exam     Patient is here today for an annual exam       History of Present Illness:     Adult Annual Physical   Patient here for a comprehensive physical exam  The patient reports pain all over which is chronic     Diet and Physical Activity  Diet/Nutrition: poor diet  Exercise: no formal exercise  Depression Screening  PHQ-2/9 Depression Screening         General Health  Sleep: sleeps well  Hearing: normal - bilateral   Vision: goes for regular eye exams  Dental: regular dental visits and brushes teeth twice daily  /GYN Health  Patient is: postmenopausal  Last menstrual period: over 5 years   Contraceptive method: none  Review of Systems:     Review of Systems   Constitutional: Negative  HENT: Negative  Eyes: Negative  Respiratory: Negative  Cardiovascular: Negative  Gastrointestinal: Negative  Endocrine: Negative      Genitourinary: Negative  Musculoskeletal: Positive for back pain, gait problem, myalgias and neck pain  Skin: Negative  Allergic/Immunologic: Negative  Hematological: Negative  Psychiatric/Behavioral: Negative  Past Medical History:     Past Medical History:   Diagnosis Date    COVID-19 1/21/2021      Past Surgical History:     Past Surgical History:   Procedure Laterality Date    DILATION AND CURETTAGE OF UTERUS        Social History:     Social History     Socioeconomic History    Marital status: Single     Spouse name: None    Number of children: None    Years of education: None    Highest education level: None   Occupational History    None   Tobacco Use    Smoking status: Never Smoker    Smokeless tobacco: Never Used    Tobacco comment: former smoker quit 4 years ago    smoked for at least 20 years    half a pack to one pack of cigarettes a day   Vaping Use    Vaping Use: Never used   Substance and Sexual Activity    Alcohol use: No    Drug use: No    Sexual activity: Yes     Partners: Male   Other Topics Concern    None   Social History Narrative    None     Social Determinants of Health     Financial Resource Strain: Not on file   Food Insecurity: Not on file   Transportation Needs: Not on file   Physical Activity: Not on file   Stress: Not on file   Social Connections: Not on file   Intimate Partner Violence: Not on file   Housing Stability: Not on file      Family History:     Family History   Problem Relation Age of Onset    Colon cancer Mother     Hypertension Mother     Endometrial cancer Mother     Stomach cancer Mother     Alcohol abuse Father     Hypertension Sister     Colon cancer Paternal Aunt     Heart disease Family       Current Medications:     Current Outpatient Medications   Medication Sig Dispense Refill    albuterol (PROVENTIL HFA,VENTOLIN HFA) 90 mcg/act inhaler INHALE 1-2 PUFFS EVERY 6 (SIX) HOURS AS NEEDED FOR WHEEZING 1 Inhaler 5    ALPRAZolam (XANAX) 0 5 mg tablet Take 1 tablet (0 5 mg total) by mouth daily at bedtime as needed for anxiety 30 tablet 0    Cholecalciferol (VITAMIN D3) 2000 units capsule Take 10,000 Units by mouth Take one capsule by mouth daily   diclofenac sodium (VOLTAREN) 1 % Place on the skin      OLIVE LEAF EXTRACT PO Take by mouth Take one capsule by mouth daily   pantoprazole (PROTONIX) 40 mg tablet Take 1 tablet (40 mg total) by mouth daily 30 tablet 2    Probiotic Product (PROBIOTIC PO) Take by mouth      fluticasone (FLOVENT HFA) 110 MCG/ACT inhaler Inhale 1 puff 2 (two) times a day (Patient not taking: Reported on 7/11/2022) 1 Inhaler 2     No current facility-administered medications for this visit  Allergies: Allergies   Allergen Reactions    Seasonal Ic  [Cholestatin]     Sulfamethoxazole-Trimethoprim      Other reaction(s): yeast infection  Annotation - [id number]: reaction is rash      Physical Exam:     /78 (BP Location: Left arm, Patient Position: Sitting, Cuff Size: Large)   Pulse 99   Temp 97 5 °F (36 4 °C) (Tympanic)   Resp 16   Ht 5' 3 27" (1 607 m)   Wt 119 kg (262 lb)   SpO2 97%   BMI 46 02 kg/m²     Physical Exam  Vitals and nursing note reviewed  Constitutional:       Appearance: Normal appearance  She is well-developed  HENT:      Head: Normocephalic and atraumatic  Right Ear: Tympanic membrane normal       Left Ear: Tympanic membrane normal       Nose: Nose normal       Mouth/Throat:      Mouth: Mucous membranes are moist    Eyes:      Pupils: Pupils are equal, round, and reactive to light  Cardiovascular:      Rate and Rhythm: Normal rate and regular rhythm  Pulses: Normal pulses  Heart sounds: Normal heart sounds  Pulmonary:      Effort: Pulmonary effort is normal  No respiratory distress  Breath sounds: Normal breath sounds  No wheezing  Abdominal:      General: Bowel sounds are normal       Palpations: Abdomen is soft     Musculoskeletal:         General: No deformity  Normal range of motion  Cervical back: Normal range of motion and neck supple  Skin:     General: Skin is warm  Capillary Refill: Capillary refill takes less than 2 seconds  Neurological:      General: No focal deficit present  Mental Status: She is alert and oriented to person, place, and time     Psychiatric:         Mood and Affect: Mood normal          Behavior: Behavior normal           Morris Andre MD  [address] [facility] Anesthesia Type: 2% lidocaine with epinephrine and a 1:10 solution of 8.4% sodium bicarbonate

## 2022-10-20 DIAGNOSIS — J45.20 MILD INTERMITTENT ASTHMA WITHOUT COMPLICATION: ICD-10-CM

## 2022-10-20 RX ORDER — DEXAMETHASONE 4 MG/1
TABLET ORAL
Qty: 12 G | Refills: 0 | Status: SHIPPED | OUTPATIENT
Start: 2022-10-20

## 2022-11-11 ENCOUNTER — TELEPHONE (OUTPATIENT)
Dept: FAMILY MEDICINE CLINIC | Facility: CLINIC | Age: 57
End: 2022-11-11

## 2022-11-11 NOTE — TELEPHONE ENCOUNTER
Patient called and was asking for a note for work due top her being out for migraine for 2 days and for anxiety today  Is it okay to write note for the patient? Please advise

## 2023-01-11 ENCOUNTER — RA CDI HCC (OUTPATIENT)
Dept: OTHER | Facility: HOSPITAL | Age: 58
End: 2023-01-11

## 2023-01-11 NOTE — PROGRESS NOTES
AsthmaNoted 1/9/2015  [J45 909]          NOT on the BPA- please assess using MEAT for 2023 billing    [facility]  coding opportunities          Chart Reviewed number of suggestions sent to Provider: 2     Patients Insurance        Commercial Insurance: [address] [place] [address]

## 2023-01-13 ENCOUNTER — VBI (OUTPATIENT)
Dept: ADMINISTRATIVE | Facility: OTHER | Age: 58
End: 2023-01-13

## 2023-01-18 ENCOUNTER — TELEPHONE (OUTPATIENT)
Dept: ADMINISTRATIVE | Facility: OTHER | Age: 58
End: 2023-01-18

## 2023-01-18 NOTE — TELEPHONE ENCOUNTER
01/18/23 3:51 PM    The patient was called and a message was left to call the ordering provider's office regarding an open order  Thank you    Delicia Tena  [facility]

## 2023-03-30 ENCOUNTER — TELEPHONE (OUTPATIENT)
Dept: ADMINISTRATIVE | Facility: OTHER | Age: 58
End: 2023-03-30

## 2023-03-30 NOTE — TELEPHONE ENCOUNTER
03/30/23 10:49 AM    The patient was called and a message was left to call the ordering provider's office regarding an open order  Thank you    Paris Acosta, [address]  [facility]

## 2023-04-26 ENCOUNTER — OFFICE VISIT (OUTPATIENT)
Dept: FAMILY MEDICINE CLINIC | Facility: CLINIC | Age: 58
End: 2023-04-26

## 2023-04-26 VITALS
HEIGHT: 63 IN | HEART RATE: 90 BPM | TEMPERATURE: 97.8 F | BODY MASS INDEX: 47.63 KG/M2 | OXYGEN SATURATION: 97 % | DIASTOLIC BLOOD PRESSURE: 74 MMHG | SYSTOLIC BLOOD PRESSURE: 116 MMHG | RESPIRATION RATE: 16 BRPM | WEIGHT: 268.8 LBS

## 2023-04-26 DIAGNOSIS — M17.0 ARTHRITIS OF BOTH KNEES: ICD-10-CM

## 2023-04-26 DIAGNOSIS — Z12.31 VISIT FOR SCREENING MAMMOGRAM: ICD-10-CM

## 2023-04-26 DIAGNOSIS — E66.01 MORBID (SEVERE) OBESITY DUE TO EXCESS CALORIES (HCC): ICD-10-CM

## 2023-04-26 DIAGNOSIS — J45.20 MILD INTERMITTENT ASTHMA, UNSPECIFIED WHETHER COMPLICATED: ICD-10-CM

## 2023-04-26 DIAGNOSIS — M22.2X2 PATELLOFEMORAL SYNDROME OF BOTH KNEES: Primary | ICD-10-CM

## 2023-04-26 DIAGNOSIS — Z23 NEED FOR TDAP VACCINATION: ICD-10-CM

## 2023-04-26 DIAGNOSIS — M22.2X1 PATELLOFEMORAL SYNDROME OF BOTH KNEES: Primary | ICD-10-CM

## 2023-04-26 RX ORDER — ALBUTEROL SULFATE 90 UG/1
1-2 AEROSOL, METERED RESPIRATORY (INHALATION) EVERY 6 HOURS PRN
Qty: 18 G | Refills: 0 | Status: SHIPPED | OUTPATIENT
Start: 2023-04-26

## 2023-04-26 RX ORDER — MELOXICAM 15 MG/1
15 TABLET ORAL DAILY PRN
Qty: 30 TABLET | Refills: 3 | Status: SHIPPED | OUTPATIENT
Start: 2023-04-26

## 2023-04-26 NOTE — ASSESSMENT & PLAN NOTE
Diet and exercise   To check with her insurance for Jac Donaldo injection   Food diary  Calories 1700 rasheeda/day

## 2023-04-26 NOTE — PROGRESS NOTES
Name: Nidia Mcardle      : 1965      MRN: [id number]  Encounter Provider: Randolph Marvin MD  Encounter Date: 2023   Encounter department: [place] [facility]     1  Patellofemoral syndrome of both knees  Assessment & Plan: To try meloxicam as needed     Orders:  -     meloxicam (MOBIC) 15 mg tablet; Take 1 tablet (15 mg total) by mouth daily as needed for moderate pain    2  Mild intermittent asthma, unspecified whether complicated  -     albuterol (PROVENTIL HFA,VENTOLIN HFA) 90 mcg/act inhaler; Inhale 1-2 puffs every 6 (six) hours as needed for wheezing    3  Arthritis of both knees  Assessment & Plan:  Restart Meloxicam   Had steroid injections in the past which didn't help   Referral to ortho    Orders:  -     Ambulatory referral to Orthopedic Surgery  -     meloxicam (MOBIC) 15 mg tablet; Take 1 tablet (15 mg total) by mouth daily as needed for moderate pain    4  Morbid (severe) obesity due to excess calories [facility])  Assessment & Plan:  Diet and exercise   To check with her insurance for [facility] injection   Food diary  Calories 1700 rasheeda/day         5  Visit for screening mammogram  -     Mammo screening bilateral w 3d & cad; Future; Expected date: 2023    6  Need for Tdap vaccination  -     TDAP VACCINE GREATER THAN OR EQUAL TO 8YO IM         Subjective      HPI   C/o worsening  bilateral knees pain for the last 1 month   C/o bilateral feet pain for few months   No injury or trauma that she could recall  She stands all day at work   [employer] and meloxicam which take the edge off     Review of Systems   Constitutional: Negative  HENT: Negative  Eyes: Negative  Respiratory: Negative  Cardiovascular: Negative for chest pain  Gastrointestinal: Negative  Endocrine: Negative  Genitourinary: Negative  Musculoskeletal: Positive for arthralgias, gait problem and myalgias  Hematological: Negative  Psychiatric/Behavioral: Negative  "      Current Outpatient Medications on File Prior to Visit   Medication Sig   • ALPRAZolam (XANAX) 0 5 mg tablet Take 1 tablet (0 5 mg total) by mouth daily at bedtime as needed for anxiety   • Cholecalciferol (VITAMIN D3) 2000 units capsule Take 10,000 Units by mouth Take one capsule by mouth daily  • diclofenac sodium (VOLTAREN) 1 % Place on the skin   • Flovent  MCG/ACT inhaler INHALE 1 PUFF TWICE A DAY   • pantoprazole (PROTONIX) 40 mg tablet Take 1 tablet (40 mg total) by mouth daily   • Probiotic Product (PROBIOTIC PO) Take by mouth   • [DISCONTINUED] albuterol (PROVENTIL HFA,VENTOLIN HFA) 90 mcg/act inhaler INHALE 1-2 PUFFS EVERY 6 (SIX) HOURS AS NEEDED FOR WHEEZING   • [DISCONTINUED] OLIVE LEAF EXTRACT PO Take by mouth Take one capsule by mouth daily  (Patient not taking: Reported on 4/26/2023)   • [DISCONTINUED] predniSONE 10 mg tablet 4 tabs x 2 days, 3 tabs x 2 days, 2 tabs x 2 days, 1 tab x 2 days (Patient not taking: Reported on 4/26/2023)       Objective     /74 (BP Location: Left arm, Patient Position: Sitting, Cuff Size: Large)   Pulse 90   Temp 97 8 °F (36 6 °C) (Tympanic)   Resp 16   Ht 5' 3 27\" (1 607 m)   Wt 122 kg (268 lb 12 8 oz)   SpO2 97%   BMI 47 21 kg/m²     Physical Exam  Constitutional:       Appearance: Normal appearance  Cardiovascular:      Rate and Rhythm: Normal rate and regular rhythm  Pulses: Normal pulses  Heart sounds: Normal heart sounds  Musculoskeletal:         General: Tenderness present  Right lower leg: No edema  Left lower leg: No edema  Comments: Left shin    Skin:     Capillary Refill: Capillary refill takes less than 2 seconds  Neurological:      General: No focal deficit present  Mental Status: She is alert and oriented to person, place, and time     Psychiatric:         Mood and Affect: Mood normal          Behavior: Behavior normal        Sabiha Yepez MD  "

## 2023-05-03 ENCOUNTER — VBI (OUTPATIENT)
Dept: ADMINISTRATIVE | Facility: OTHER | Age: 58
End: 2023-05-03

## 2023-05-10 ENCOUNTER — APPOINTMENT (EMERGENCY)
Dept: RADIOLOGY | Facility: HOSPITAL | Age: 58
End: 2023-05-10

## 2023-05-10 ENCOUNTER — HOSPITAL ENCOUNTER (EMERGENCY)
Facility: HOSPITAL | Age: 58
Discharge: HOME | End: 2023-05-10
Attending: EMERGENCY MEDICINE

## 2023-05-10 VITALS
SYSTOLIC BLOOD PRESSURE: 140 MMHG | WEIGHT: 264.55 LBS | HEART RATE: 86 BPM | OXYGEN SATURATION: 99 % | DIASTOLIC BLOOD PRESSURE: 98 MMHG | RESPIRATION RATE: 16 BRPM | TEMPERATURE: 97.8 F | BODY MASS INDEX: 46.46 KG/M2

## 2023-05-10 DIAGNOSIS — M79.672 LEFT FOOT PAIN: ICD-10-CM

## 2023-05-10 DIAGNOSIS — N39.0 UTI (URINARY TRACT INFECTION): Primary | ICD-10-CM

## 2023-05-10 LAB
BACTERIA UR QL AUTO: ABNORMAL /HPF
BILIRUB UR QL STRIP: NEGATIVE
CLARITY UR: ABNORMAL
COLOR UR: YELLOW
GLUCOSE UR STRIP-MCNC: NEGATIVE MG/DL
HGB UR QL STRIP.AUTO: ABNORMAL
KETONES UR STRIP-MCNC: NEGATIVE MG/DL
LEUKOCYTE ESTERASE UR QL STRIP: ABNORMAL
MUCOUS THREADS UR QL AUTO: ABNORMAL
NITRITE UR QL STRIP: NEGATIVE
NON-SQ EPI CELLS URNS QL MICRO: ABNORMAL /HPF
PH UR STRIP.AUTO: 5.5 [PH]
PROT UR STRIP-MCNC: ABNORMAL MG/DL
RBC #/AREA URNS AUTO: ABNORMAL /HPF
SP GR UR STRIP.AUTO: 1.03 (ref 1–1.03)
UROBILINOGEN UR STRIP-ACNC: <2 MG/DL
WBC #/AREA URNS AUTO: ABNORMAL /HPF

## 2023-05-10 RX ORDER — CEPHALEXIN 250 MG/1
500 CAPSULE ORAL ONCE
Status: COMPLETED | OUTPATIENT
Start: 2023-05-10 | End: 2023-05-10

## 2023-05-10 RX ORDER — OXYCODONE HYDROCHLORIDE AND ACETAMINOPHEN 5; 325 MG/1; MG/1
1 TABLET ORAL EVERY 6 HOURS PRN
Qty: 6 TABLET | Refills: 0 | Status: SHIPPED | OUTPATIENT
Start: 2023-05-10 | End: 2023-05-20

## 2023-05-10 RX ORDER — CEPHALEXIN 500 MG/1
500 CAPSULE ORAL EVERY 12 HOURS SCHEDULED
Qty: 9 CAPSULE | Refills: 0 | Status: SHIPPED | OUTPATIENT
Start: 2023-05-10 | End: 2023-05-15

## 2023-05-10 RX ADMIN — CEPHALEXIN 500 MG: 250 CAPSULE ORAL at 14:49

## 2023-05-10 NOTE — Clinical Note
Tani Mcneill was seen and treated in our emergency department on 5/10/2023  No restrictions            Diagnosis:     Ena Florence  may return to work on return date  She may return on this date: 05/13/2023    Please excuse Ena Florence from work for some time as she was seen in the emergency department for an acute medical illness     If you have any questions or concerns, please don't hesitate to call        Perri Roldan,     ______________________________           _______________          _______________  Hospital Representative                              Date                                Time all other ROS negative except as per HPI

## 2023-05-12 LAB
BACTERIA UR CULT: ABNORMAL
BACTERIA UR CULT: ABNORMAL

## 2023-05-12 NOTE — ED PROVIDER NOTES
History  Chief Complaint   Patient presents with   • Ankle Swelling     Pt with left ankle swelling on and off for awhile, denies acute Injury but states she is on feet all day at her job  Worse over the last week  Also reports left knee pain   • Painful Urination     Pt with suprapubic pressure and burning on urination since this am     HPI     This is a 62 y/o F presenting with weeks of intermittent ankle swelling and burning on urination for one day  She states that she works at [employer] and spends long hours on her feet daily, and lately has been having worsening left foot and ankle pain  She localizes the pain to the bottom of the foot but also states it radiates upward to the ankle when she bears weight  She feels it improves somewhat with anti-inflammatory medications  She also reports issues with knee discomfort for which she follows orthopedics  Denies any weakness or numbness, recent fevers or trauma  Burning on urination started earlier this morning  She notes a sensation of pressure and dysuria when attempting to urinate that feels similar to previous UTIs  She also notes a dark, almost blood-color to her urine  Denies any flank pain or chills  No abnormal vaginal discharge  Prior to Admission Medications   Prescriptions Last Dose Informant Patient Reported? Taking? ALPRAZolam (XANAX) 0 5 mg tablet   No No   Sig: Take 1 tablet (0 5 mg total) by mouth daily at bedtime as needed for anxiety   Cholecalciferol (VITAMIN D3) 2000 units capsule   Yes No   Sig: Take 10,000 Units by mouth Take one capsule by mouth daily     Flovent  MCG/ACT inhaler   No No   Sig: INHALE 1 PUFF TWICE A DAY   Probiotic Product (PROBIOTIC PO)   Yes No   Sig: Take by mouth   albuterol (PROVENTIL HFA,VENTOLIN HFA) 90 mcg/act inhaler   No No   Sig: Inhale 1-2 puffs every 6 (six) hours as needed for wheezing   diclofenac sodium (VOLTAREN) 1 %   Yes No   Sig: Place on the skin   meloxicam (MOBIC) 15 mg tablet   No No Sig: Take 1 tablet (15 mg total) by mouth daily as needed for moderate pain   pantoprazole (PROTONIX) 40 mg tablet   No No   Sig: Take 1 tablet (40 mg total) by mouth daily      Facility-Administered Medications: None       Past Medical History:   Diagnosis Date   • COVID-19 1/21/2021       Past Surgical History:   Procedure Laterality Date   • DILATION AND CURETTAGE OF UTERUS         Family History   Problem Relation Age of Onset   • Colon cancer Mother    • Hypertension Mother    • Endometrial cancer Mother    • Stomach cancer Mother    • Alcohol abuse Father    • Hypertension Sister    • Colon cancer Paternal Aunt    • Heart disease Family      I have reviewed and agree with the history as documented  E-Cigarette/Vaping   • E-Cigarette Use Never User      E-Cigarette/Vaping Substances   • Nicotine No    • THC No    • CBD No    • Flavoring No    • Other No    • Unknown No      Social History     Tobacco Use   • Smoking status: Never     Passive exposure: Never   • Smokeless tobacco: Never   • Tobacco comments:     former smoker quit 4 years ago    smoked for at least 20 years    half a pack to one pack of cigarettes a day   Vaping Use   • Vaping Use: Never used   Substance Use Topics   • Alcohol use: No   • Drug use: No        Review of Systems   Constitutional: Negative for chills and fever  HENT: Negative for ear pain and sore throat  Eyes: Negative for pain and visual disturbance  Respiratory: Negative for cough and shortness of breath  Cardiovascular: Negative for chest pain and palpitations  Gastrointestinal: Negative for abdominal pain and vomiting  Genitourinary: Positive for dysuria and frequency  Negative for hematuria  Musculoskeletal: Positive for joint swelling  Negative for arthralgias and back pain  L ankle swelling and pain   Skin: Negative for color change and rash  Neurological: Negative for seizures and syncope     All other systems reviewed and are negative  Physical Exam  ED Triage Vitals [05/10/23 1149]   Temperature Pulse Respirations Blood Pressure SpO2   97 8 °F (36 6 °C) 86 16 140/98 99 %      Temp Source Heart Rate Source Patient Position - Orthostatic VS BP Location FiO2 (%)   Oral Monitor -- Right arm --      Pain Score       --             Orthostatic Vital Signs  Vitals:    05/10/23 1149   BP: 140/98   Pulse: 86       Physical Exam  Vitals and nursing note reviewed  Constitutional:       General: She is not in acute distress  Appearance: She is well-developed  HENT:      Head: Normocephalic and atraumatic  Eyes:      Conjunctiva/sclera: Conjunctivae normal    Cardiovascular:      Rate and Rhythm: Normal rate and regular rhythm  Heart sounds: No murmur heard  Pulmonary:      Effort: Pulmonary effort is normal  No respiratory distress  Breath sounds: Normal breath sounds  Abdominal:      Palpations: Abdomen is soft  Tenderness: There is no abdominal tenderness  Musculoskeletal:         General: Swelling and tenderness present  Cervical back: Neck supple  Comments: Mild swelling and tenderness to plantar surface of foot, around medial malleolus of L ankle  ROM preserved  Skin:     General: Skin is warm and dry  Capillary Refill: Capillary refill takes less than 2 seconds  Neurological:      Mental Status: She is alert     Psychiatric:         Mood and Affect: Mood normal          ED Medications  Medications   cephalexin (KEFLEX) capsule 500 mg (500 mg Oral Given 5/10/23 1449)       Diagnostic Studies  Results Reviewed     Procedure Component Value Units Date/Time    Urine culture [[id number]]  (Abnormal) Collected: 05/10/23 1320    Lab Status: Preliminary result Specimen: Urine, Clean Catch Updated: 05/11/23 1500     Urine Culture >100,000 cfu/ml Escherichia coli    Urine Microscopic [[id number]]  (Abnormal) Collected: 05/10/23 1320    Lab Status: Final result Specimen: Urine, Clean Catch Updated: 05/10/23 1413     RBC, UA Innumerable /hpf      WBC, UA Innumerable /hpf      Epithelial Cells None Seen /hpf      Bacteria, UA None Seen /hpf      MUCUS THREADS Innumerable    UA w Reflex to Microscopic w Reflex to Culture [[id number]]  (Abnormal) Collected: 05/10/23 1320    Lab Status: Final result Specimen: Urine, Clean Catch Updated: 05/10/23 1351     Color, UA Yellow     Clarity, UA Extra Turbid     Specific Gravity, UA 1 035     pH, UA 5 5     Leukocytes, UA Large     Nitrite, UA Negative     Protein,  (2+) mg/dl      Glucose, UA Negative mg/dl      Ketones, UA Negative mg/dl      Urobilinogen, UA <2 0 mg/dl      Bilirubin, UA Negative     Occult Blood, UA Large                 XR foot 3+ views LEFT   ED Interpretation by Analilia Snowden MD (05/10 1238)   nad      Final Result by Jaspreet Loving MD (05/10 1650)      No acute osseous abnormality  Workstation performed: [id number]         XR knee 4+ views LEFT   ED Interpretation by Analilia Snowden MD (05/10 1238)   nad      Final Result by Jaspreet Loving MD (05/10 1648)      No acute osseous abnormality  Workstation performed: [id number]               Procedures  Procedures      ED Course                                       Medical Decision Making  62 y/o F patient presenting with foot pain and swelling, dysuria  Differential diagnoses included but were not limited to fracture, sprain/strain, plantar fasciitis, arthritis with effusion, dependent edema  With regard to dysuria, considered UTI vs pyelonephritis vs renal stone  Pt vital signs were stable on initial arrival  Exam showed some mild swelling to the left foot and ankle without overlying skin changes  ROM was preserved  The patient had no flank pain, making pyelonephritis or nephrolithiasis less likely  To rule out any occult fracture, xray was done which was unremarkable  Urinalysis was also collected which was concerning for UTI   Ultimately, patient was given a course of antibiotics for UTI and referral for podiatry  Ankle pathology as above is still possible, though best determined outpatient by podiatry  Did advise to use anti-inflammatory medications and use heel support when possible  Left foot pain: acute illness or injury  UTI (urinary tract infection): acute illness or injury  Amount and/or Complexity of Data Reviewed  Labs: ordered  Radiology: ordered and independent interpretation performed  Risk  Prescription drug management  Disposition  Final diagnoses:   UTI (urinary tract infection)   Left foot pain     Time reflects when diagnosis was documented in both MDM as applicable and the Disposition within this note     Time User Action Codes Description Comment    5/10/2023  2:47 PM Jacinta Mustafa Add [N39 0] UTI (urinary tract infection)     5/10/2023  3:02 PM Jacinta Mustafa Add [M62 888] Left foot pain       ED Disposition     ED Disposition   Discharge    Condition   Stable    Date/Time   Wed May 10, 2023  2:47 PM    Comment   Andie Espitia discharge to home/self care                 Follow-up Information     Follow up With Specialties Details Why Contact Info Additional Information    Kika Borden MD Family Medicine Call  As needed [address] [place] [address]   [phone]       BRAEDEN CARLISLE [facility] Podiatry Schedule an appointment as soon as possible for a visit   [address] [facility] [id number]  [address] [facility], [address] [place] [address], [address] [facility]          Discharge Medication List as of 5/10/2023  3:04 PM      START taking these medications    Details   cephalexin (KEFLEX) 500 mg capsule Take 1 capsule (500 mg total) by mouth every 12 (twelve) hours for 5 days, Starting Wed 5/10/2023, Until Mon 5/15/2023, Normal      oxyCODONE-acetaminophen (Percocet) 5-325 mg per tablet Take 1 tablet by mouth every 6 (six) hours as needed for severe pain for up to 10 days Max Daily Amount: 4 tablets, Starting Wed 5/10/2023, Until Sat 5/20/2023 at 2359, Normal         CONTINUE these medications which have NOT CHANGED    Details   albuterol (PROVENTIL HFA,VENTOLIN HFA) 90 mcg/act inhaler Inhale 1-2 puffs every 6 (six) hours as needed for wheezing, Starting Wed 4/26/2023, Normal      ALPRAZolam (XANAX) 0 5 mg tablet Take 1 tablet (0 5 mg total) by mouth daily at bedtime as needed for anxiety, Starting Tue 1/30/2018, Print      Cholecalciferol (VITAMIN D3) 2000 units capsule Take 10,000 Units by mouth Take one capsule by mouth daily  , Historical Med      diclofenac sodium (VOLTAREN) 1 % Place on the skin, Starting Wed 11/30/2016, Historical Med      Flovent  MCG/ACT inhaler INHALE 1 PUFF TWICE A DAY, Normal      meloxicam (MOBIC) 15 mg tablet Take 1 tablet (15 mg total) by mouth daily as needed for moderate pain, Starting Wed 4/26/2023, Normal      pantoprazole (PROTONIX) 40 mg tablet Take 1 tablet (40 mg total) by mouth daily, Starting Fri 9/23/2022, Normal      Probiotic Product (PROBIOTIC PO) Take by mouth, Historical Med               PDMP Review     None           ED Provider  Attending physically available and evaluated Dannie Luther I managed the patient along with the ED Attending      Electronically Signed by         Fitz Akins DO  05/11/23 4192

## 2023-05-12 NOTE — RESULT ENCOUNTER NOTE
Patient's urine culture results were reviewed  Greater than 100,000 colony count of E  coli  Patient is on Keflex    No further action required

## 2023-05-17 NOTE — ED ATTENDING ATTESTATION
5/10/2023  I, Anton Ramirez MD, saw and evaluated the patient  I have discussed the patient with the resident/non-physician practitioner and agree with the resident's/non-physician practitioner's findings, Plan of Care, and MDM as documented in the resident's/non-physician practitioner's note, except where noted  All available labs and Radiology studies were reviewed  I was present for key portions of any procedure(s) performed by the resident/non-physician practitioner and I was immediately available to provide assistance  At this point I agree with the current assessment done in the Emergency Department  I have conducted an independent evaluation of this patient a history and physical is as follows:    62 yo female p/w with two distinct complaints, reports atraumatic ankle swelling for the last several weeks, no obvious cause/inciting event, no systemic complaints in cluding fever/chills, nausea/vomiting, skin changes  Pt works long hours on feet, swelling waxes/wanes  No h/o gout or septic joint, no recent surgical intervention or reason to be immunocompromised  Feet are benign essential   Mild swelling noted without focal bony TTP, no skin changes, redness or warmth  No microtenderness  Joints stable  No calf tenderness  Denies LE pain/swelling/recent travel/exogenous hormone/personal or familial history of VTE  Not consistent with gout, septic joint, vascular compromise, VTE, or infection  Will f/u with podiatry  Pt also with complaint of urinary symptoms  No fever or belly or back pain  NO vaginal discharge or hematuria  Feels systemically well  On exam belly benign, no CVA TTP  Urine c/w UTI, will treat  Pt safe for dispo  RTER precautions discussed and documented on discharge paperwork, pt and family endorsed good understanding of reasons to return           ED Course  ED Course as of 05/17/23 1105   Wed May 10, 2023   1406 UA w Reflex to Microscopic w Reflex to Culture(!)  Large blood and LE, negative nitrites, will await micro   1427 Urine Microscopic(!)   1428 Consistent with UTI           Critical Care Time  Procedures

## 2023-05-27 ENCOUNTER — APPOINTMENT (OUTPATIENT)
Dept: LAB | Facility: CLINIC | Age: 58
End: 2023-05-27

## 2023-05-27 DIAGNOSIS — Z00.00 ANNUAL PHYSICAL EXAM: ICD-10-CM

## 2023-05-27 DIAGNOSIS — Z11.4 SCREENING FOR HIV (HUMAN IMMUNODEFICIENCY VIRUS): ICD-10-CM

## 2023-05-27 DIAGNOSIS — Z11.59 NEED FOR HEPATITIS C SCREENING TEST: ICD-10-CM

## 2023-05-27 LAB
ALBUMIN SERPL BCP-MCNC: 4.2 G/DL (ref 3.5–5)
ALP SERPL-CCNC: 86 U/L (ref 34–104)
ALT SERPL W P-5'-P-CCNC: 42 U/L (ref 7–52)
ANION GAP SERPL CALCULATED.3IONS-SCNC: 5 MMOL/L (ref 4–13)
AST SERPL W P-5'-P-CCNC: 23 U/L (ref 13–39)
BASOPHILS # BLD AUTO: 0.04 THOUSANDS/ÂΜL (ref 0–0.1)
BASOPHILS NFR BLD AUTO: 1 % (ref 0–1)
BILIRUB SERPL-MCNC: 0.52 MG/DL (ref 0.2–1)
BUN SERPL-MCNC: 13 MG/DL (ref 5–25)
CALCIUM SERPL-MCNC: 9.3 MG/DL (ref 8.4–10.2)
CHLORIDE SERPL-SCNC: 104 MMOL/L (ref 96–108)
CHOLEST SERPL-MCNC: 155 MG/DL
CO2 SERPL-SCNC: 30 MMOL/L (ref 21–32)
CREAT SERPL-MCNC: 0.8 MG/DL (ref 0.6–1.3)
EOSINOPHIL # BLD AUTO: 0.24 THOUSAND/ÂΜL (ref 0–0.61)
EOSINOPHIL NFR BLD AUTO: 3 % (ref 0–6)
ERYTHROCYTE [DISTWIDTH] IN BLOOD BY AUTOMATED COUNT: 13.3 % (ref 11.6–15.1)
GFR SERPL CREATININE-BSD FRML MDRD: 82 ML/MIN/1.73SQ M
GLUCOSE P FAST SERPL-MCNC: 98 MG/DL (ref 65–99)
HCT VFR BLD AUTO: 41.2 % (ref 34.8–46.1)
HDLC SERPL-MCNC: 51 MG/DL
HGB BLD-MCNC: 13 G/DL (ref 11.5–15.4)
HIV 1+2 AB+HIV1 P24 AG SERPL QL IA: NORMAL
HIV 2 AB SERPL QL IA: NORMAL
HIV1 AB SERPL QL IA: NORMAL
HIV1 P24 AG SERPL QL IA: NORMAL
IMM GRANULOCYTES # BLD AUTO: 0.02 THOUSAND/UL (ref 0–0.2)
IMM GRANULOCYTES NFR BLD AUTO: 0 % (ref 0–2)
LDLC SERPL CALC-MCNC: 81 MG/DL (ref 0–100)
LYMPHOCYTES # BLD AUTO: 1.62 THOUSANDS/ÂΜL (ref 0.6–4.47)
LYMPHOCYTES NFR BLD AUTO: 23 % (ref 14–44)
MCH RBC QN AUTO: 29.5 PG (ref 26.8–34.3)
MCHC RBC AUTO-ENTMCNC: 31.6 G/DL (ref 31.4–37.4)
MCV RBC AUTO: 93 FL (ref 82–98)
MONOCYTES # BLD AUTO: 0.42 THOUSAND/ÂΜL (ref 0.17–1.22)
MONOCYTES NFR BLD AUTO: 6 % (ref 4–12)
NEUTROPHILS # BLD AUTO: 4.69 THOUSANDS/ÂΜL (ref 1.85–7.62)
NEUTS SEG NFR BLD AUTO: 67 % (ref 43–75)
NRBC BLD AUTO-RTO: 0 /100 WBCS
PLATELET # BLD AUTO: 244 THOUSANDS/UL (ref 149–390)
PMV BLD AUTO: 9.6 FL (ref 8.9–12.7)
POTASSIUM SERPL-SCNC: 4.1 MMOL/L (ref 3.5–5.3)
PROT SERPL-MCNC: 6.8 G/DL (ref 6.4–8.4)
RBC # BLD AUTO: 4.41 MILLION/UL (ref 3.81–5.12)
SODIUM SERPL-SCNC: 139 MMOL/L (ref 135–147)
TRIGL SERPL-MCNC: 116 MG/DL
TSH SERPL DL<=0.05 MIU/L-ACNC: 1.43 UIU/ML (ref 0.45–4.5)
WBC # BLD AUTO: 7.03 THOUSAND/UL (ref 4.31–10.16)

## 2023-05-28 LAB — HCV AB SER QL: NORMAL

## 2023-05-31 ENCOUNTER — VBI (OUTPATIENT)
Dept: ADMINISTRATIVE | Facility: OTHER | Age: 58
End: 2023-05-31

## 2023-06-15 ENCOUNTER — OFFICE VISIT (OUTPATIENT)
Dept: PODIATRY | Facility: CLINIC | Age: 58
End: 2023-06-15
Payer: COMMERCIAL

## 2023-06-15 VITALS
BODY MASS INDEX: 46.6 KG/M2 | HEART RATE: 94 BPM | SYSTOLIC BLOOD PRESSURE: 133 MMHG | DIASTOLIC BLOOD PRESSURE: 81 MMHG | HEIGHT: 63 IN | WEIGHT: 263 LBS

## 2023-06-15 DIAGNOSIS — M21.42 PES PLANUS OF BOTH FEET: ICD-10-CM

## 2023-06-15 DIAGNOSIS — M76.822 POSTERIOR TIBIAL TENDON DYSFUNCTION (PTTD) OF LEFT LOWER EXTREMITY: Primary | ICD-10-CM

## 2023-06-15 DIAGNOSIS — M21.41 PES PLANUS OF BOTH FEET: ICD-10-CM

## 2023-06-15 DIAGNOSIS — M79.672 LEFT FOOT PAIN: ICD-10-CM

## 2023-06-15 PROBLEM — M21.40 FLAT FOOT: Status: ACTIVE | Noted: 2023-06-15

## 2023-06-15 PROCEDURE — 99203 OFFICE O/P NEW LOW 30 MIN: CPT | Performed by: PODIATRIST

## 2023-06-15 NOTE — PROGRESS NOTES
Assessment/Plan:       Diagnoses and all orders for this visit:    Posterior tibial tendon dysfunction (PTTD) of left lower extremity  -     Ambulatory referral to Physical Therapy; Future  -     Brace    Left foot pain  -     Ambulatory Referral to Podiatry  -     Ambulatory referral to Physical Therapy; Future  -     Brace    Pes planus of both feet  -     Ambulatory referral to Physical Therapy; Future  -     Brace      Diagnosis and options discussed with patient  Patient agreeable to the plan as stated below    Patient had XR last month, no acute findings by my read  Patient has collapsing pes planus, stage 1 posterior tibial tendon dysfunction complicated by heavy activity on her feet and morbid obesity  She needs better support at work, weight loss and physical therapy  She has NSAIDs as needed    ASO brace dispensed, wear when at work    Prescribed custom orthotics to wear in shoes all the time    Prescribed physical therapy    I had a corinna discussion about her general joint and back pains  She needs to take aggressive measures to lose weight or none of these issues will improve  Subjective:      Patient ID: Dulce Maria Perez is a 63 y o  female  Patient presents with chronic left ankle swelling  She gets pain all around both ankles the left is worse than the right  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  She has chronic joint pains throughout her knees, hips and back  The following portions of the patient's history were reviewed and updated as appropriate: allergies, current medications, past family history, past medical history, past social history, past surgical history and problem list     Review of Systems    Constitutional: Negative  Respiratory: Negative for cough and shortness of breath  Gastrointestinal: Negative for diarrhea, nausea and vomiting  Musculoskeletal: chronic joint pain   Skin: Negative for rash or wound     Neurological: Negative for "weakness, numbness and headaches  Objective:      /81   Pulse 94   Ht 5' 3 27\" (1 607 m)   Wt 119 kg (263 lb)   BMI 46 19 kg/m²          Physical Exam  Vitals reviewed  Constitutional:       Appearance: She is morbidly obese  Cardiovascular:      Pulses: Normal pulses  Dorsalis pedis pulses are 2+ on the right side and 2+ on the left side  Posterior tibial pulses are 2+ on the right side and 2+ on the left side  Pulmonary:      Effort: No respiratory distress  Musculoskeletal:         General: Tenderness present  Right lower leg: Edema present  Left lower leg: Edema present  Right foot: Decreased range of motion  Deformity present  Left foot: Decreased range of motion  Deformity (severe pes plano valgus  Stressed inversion causes PTT tenderness  Unable to perform heel raise B/L  No ankle instability  ) present  Feet:      Right foot:      Protective Sensation: 10 sites sensed  Left foot:      Protective Sensation: 10 sites sensed  Skin:     Capillary Refill: Capillary refill takes less than 2 seconds  Findings: No erythema or rash  Neurological:      Mental Status: She is alert  Sensory: No sensory deficit        Gait: Gait abnormal              "

## 2023-06-23 ENCOUNTER — VBI (OUTPATIENT)
Dept: ADMINISTRATIVE | Facility: OTHER | Age: 58
End: 2023-06-23

## 2023-07-14 ENCOUNTER — OFFICE VISIT (OUTPATIENT)
Dept: FAMILY MEDICINE CLINIC | Facility: CLINIC | Age: 58
End: 2023-07-14
Payer: COMMERCIAL

## 2023-07-14 VITALS
BODY MASS INDEX: 45.55 KG/M2 | WEIGHT: 266.8 LBS | SYSTOLIC BLOOD PRESSURE: 130 MMHG | HEART RATE: 79 BPM | RESPIRATION RATE: 18 BRPM | HEIGHT: 64 IN | OXYGEN SATURATION: 98 % | DIASTOLIC BLOOD PRESSURE: 82 MMHG | TEMPERATURE: 98.1 F

## 2023-07-14 DIAGNOSIS — M76.822 POSTERIOR TIBIAL TENDON DYSFUNCTION (PTTD) OF LEFT LOWER EXTREMITY: ICD-10-CM

## 2023-07-14 DIAGNOSIS — K21.9 GASTROESOPHAGEAL REFLUX DISEASE WITHOUT ESOPHAGITIS: ICD-10-CM

## 2023-07-14 DIAGNOSIS — E66.01 MORBID (SEVERE) OBESITY DUE TO EXCESS CALORIES (HCC): ICD-10-CM

## 2023-07-14 DIAGNOSIS — J45.20 MILD INTERMITTENT ASTHMA WITHOUT COMPLICATION: ICD-10-CM

## 2023-07-14 DIAGNOSIS — Z00.00 ANNUAL PHYSICAL EXAM: Primary | ICD-10-CM

## 2023-07-14 PROBLEM — M21.40 FLAT FOOT: Status: RESOLVED | Noted: 2023-06-15 | Resolved: 2023-07-14

## 2023-07-14 PROCEDURE — 99396 PREV VISIT EST AGE 40-64: CPT | Performed by: FAMILY MEDICINE

## 2023-07-14 PROCEDURE — 99214 OFFICE O/P EST MOD 30 MIN: CPT | Performed by: FAMILY MEDICINE

## 2023-07-14 RX ORDER — FLUTICASONE PROPIONATE 110 UG/1
1 AEROSOL, METERED RESPIRATORY (INHALATION) 2 TIMES DAILY
Qty: 12 G | Refills: 0 | Status: SHIPPED | OUTPATIENT
Start: 2023-07-14

## 2023-07-14 RX ORDER — PANTOPRAZOLE SODIUM 40 MG/1
40 TABLET, DELAYED RELEASE ORAL DAILY
Qty: 30 TABLET | Refills: 2 | Status: SHIPPED | OUTPATIENT
Start: 2023-07-14

## 2023-07-14 NOTE — PROGRESS NOTES
[address] [facility]    NAME: Lonnie Both  AGE: 63 y.o. SEX: female  : 1965     DATE: 2023     Assessment and Plan:     Problem List Items Addressed This Visit        Digestive    Esophageal reflux     Takes pantoprazole few times a week  To avoid triggers         Relevant Medications    pantoprazole (PROTONIX) 40 mg tablet       Respiratory    Asthma     Still having issues  To start flovent again          Relevant Medications    fluticasone (Flovent HFA) 110 MCG/ACT inhaler       Musculoskeletal and Integument    Posterior tibial tendon dysfunction (PTTD) of left lower extremity     Brace is helping            Other    Morbid (severe) obesity due to excess calories (HCC)     Portion control  Diet and exercise         Other Visit Diagnoses     Annual physical exam    -  Primary          Immunizations and preventive care screenings were discussed with patient today. Appropriate education was printed on patient's after visit summary. Counseling:  Alcohol/drug use: discussed moderation in alcohol intake, the recommendations for healthy alcohol use, and avoidance of illicit drug use. Dental Health: discussed importance of regular tooth brushing, flossing, and dental visits. Injury prevention: discussed safety/seat belts, safety helmets, smoke detectors, carbon dioxide detectors, and smoking near bedding or upholstery. Sexual health: discussed sexually transmitted diseases, partner selection, use of condoms, avoidance of unintended pregnancy, and contraceptive alternatives. Exercise: the importance of regular exercise/physical activity was discussed. Recommend exercise 3-5 times per week for at least 30 minutes. BMI Counseling: Body mass index is 46.46 kg/m². The BMI is above normal. Nutrition recommendations include decreasing portion sizes and encouraging healthy choices of fruits and vegetables.  Exercise recommendations include moderate physical activity 150 minutes/week. No pharmacotherapy was ordered. Rationale for BMI follow-up plan is due to patient being overweight or obese. Depression Screening and Follow-up Plan: Patient was screened for depression during today's encounter. They screened negative with a PHQ-2 score of 0. No follow-ups on file. Chief Complaint:     Chief Complaint   Patient presents with   • Physical Exam     Patient being seen for Physical Exam      History of Present Illness:     Adult Annual Physical   Patient here for a comprehensive physical exam. The patient reports no problems. Diet and Physical Activity  Diet/Nutrition: limited fruits/vegetables. Exercise: walking. Depression Screening  PHQ-2/9 Depression Screening    Little interest or pleasure in doing things: 0 - not at all  Feeling down, depressed, or hopeless: 0 - not at all  PHQ-2 Score: 0  PHQ-2 Interpretation: Negative depression screen       General Health  Sleep: sleeps well and gets 7-8 hours of sleep on average. Hearing: normal - bilateral.  Vision: goes for regular eye exams. Dental: regular dental visits and brushes teeth twice daily. /GYN Health  Patient is: postmenopausal  Last menstrual period: over 6 years ago  Contraceptive method: none. Review of Systems:     Review of Systems   Constitutional: Negative. HENT: Negative. Eyes: Negative. Respiratory: Negative. Cardiovascular: Negative. Gastrointestinal: Negative. Endocrine: Negative. Genitourinary: Negative. Musculoskeletal: Negative. Skin: Negative. Allergic/Immunologic: Negative. Neurological: Negative. Hematological: Negative. Psychiatric/Behavioral: Negative.        Past Medical History:     Past Medical History:   Diagnosis Date   • COVID-19 1/21/2021      Past Surgical History:     Past Surgical History:   Procedure Laterality Date   • DILATION AND CURETTAGE OF UTERUS        Social History:     Social History     Socioeconomic History   • Marital status: Single     Spouse name: None   • Number of children: None   • Years of education: None   • Highest education level: None   Occupational History   • None   Tobacco Use   • Smoking status: Never     Passive exposure: Never   • Smokeless tobacco: Never   • Tobacco comments:     former smoker quit 4 years ago. . smoked for at least 20 years. . half a pack to one pack of cigarettes a day   Vaping Use   • Vaping Use: Never used   Substance and Sexual Activity   • Alcohol use: No   • Drug use: No   • Sexual activity: Yes     Partners: Male   Other Topics Concern   • None   Social History Narrative   • None     Social Determinants of Health     Financial Resource Strain: Not on file   Food Insecurity: Not on file   Transportation Needs: Not on file   Physical Activity: Not on file   Stress: Not on file   Social Connections: Not on file   Intimate Partner Violence: Not on file   Housing Stability: Not on file      Family History:     Family History   Problem Relation Age of Onset   • Colon cancer Mother    • Hypertension Mother    • Endometrial cancer Mother    • Stomach cancer Mother    • Alcohol abuse Father    • Hypertension Sister    • Colon cancer Paternal Aunt    • Heart disease Family       Current Medications:     Current Outpatient Medications   Medication Sig Dispense Refill   • albuterol (PROVENTIL HFA,VENTOLIN HFA) 90 mcg/act inhaler Inhale 1-2 puffs every 6 (six) hours as needed for wheezing 18 g 0   • ALPRAZolam (XANAX) 0.5 mg tablet Take 1 tablet (0.5 mg total) by mouth daily at bedtime as needed for anxiety 30 tablet 0   • Cholecalciferol (VITAMIN D3) 2000 units capsule Take 10,000 Units by mouth Take one capsule by mouth daily. • diclofenac sodium (VOLTAREN) 1 % Place on the skin     • fluticasone (Flovent HFA) 110 MCG/ACT inhaler Inhale 1 puff 2 (two) times a day Rinse mouth after use.  12 g 0   • meloxicam (MOBIC) 15 mg tablet Take 1 tablet (15 mg total) by mouth daily as needed for moderate pain 30 tablet 3   • pantoprazole (PROTONIX) 40 mg tablet Take 1 tablet (40 mg total) by mouth daily 30 tablet 2   • Probiotic Product (PROBIOTIC PO) Take by mouth       No current facility-administered medications for this visit. Allergies: Allergies   Allergen Reactions   • Seasonal Ic  [Cholestatin]    • Sulfamethoxazole-Trimethoprim      Other reaction(s): yeast infection  Annotation - [id number]: reaction is rash      Physical Exam:     /82 (BP Location: Left arm, Patient Position: Sitting, Cuff Size: Large)   Pulse 79   Temp 98.1 °F (36.7 °C) (Tympanic)   Resp 18   Ht 5' 3.54" (1.614 m)   Wt 121 kg (266 lb 12.8 oz)   SpO2 98%   BMI 46.46 kg/m²     Physical Exam  Constitutional:       Appearance: Normal appearance. She is well-developed. HENT:      Head: Normocephalic and atraumatic. Right Ear: Tympanic membrane normal.      Left Ear: Tympanic membrane normal.      Nose: Nose normal.      Mouth/Throat:      Mouth: Mucous membranes are moist.   Eyes:      Pupils: Pupils are equal, round, and reactive to light. Cardiovascular:      Rate and Rhythm: Normal rate and regular rhythm. Pulses: Normal pulses. Heart sounds: Normal heart sounds. Pulmonary:      Effort: Pulmonary effort is normal. No respiratory distress. Breath sounds: Normal breath sounds. No wheezing. Abdominal:      General: Bowel sounds are normal.      Palpations: Abdomen is soft. Musculoskeletal:         General: No deformity. Normal range of motion. Cervical back: Normal range of motion and neck supple. Skin:     General: Skin is warm. Capillary Refill: Capillary refill takes less than 2 seconds. Neurological:      General: No focal deficit present. Mental Status: She is alert and oriented to person, place, and time.    Psychiatric:         Mood and Affect: Mood normal.         Behavior: Behavior normal.          Melisa MD Ariel  [address] [place] [address]

## 2023-08-15 ENCOUNTER — VBI (OUTPATIENT)
Dept: ADMINISTRATIVE | Facility: OTHER | Age: 58
End: 2023-08-15

## 2023-10-04 ENCOUNTER — TELEPHONE (OUTPATIENT)
Dept: FAMILY MEDICINE CLINIC | Facility: CLINIC | Age: 58
End: 2023-10-04

## 2023-10-09 ENCOUNTER — TELEPHONE (OUTPATIENT)
Dept: FAMILY MEDICINE CLINIC | Facility: CLINIC | Age: 58
End: 2023-10-09

## 2023-10-09 NOTE — TELEPHONE ENCOUNTER
Left message for patient for FMLA forms that are ready. Patient needs to be present sign the forms and date it.   Schedule a visit to see Dr. Amol Umana

## 2023-11-01 ENCOUNTER — TELEPHONE (OUTPATIENT)
Dept: OTHER | Facility: HOSPITAL | Age: 58
End: 2023-11-01

## 2023-11-01 NOTE — TELEPHONE ENCOUNTER
Patient called asking in provider would call her something in for pain. Patient states she has arthritis in her knees and had something done to her foot/toe recently. I did look for an open appointment to get patient in but provider did not have any that I saw. Please follow up with patient on providers recommendation.

## 2023-11-02 NOTE — TELEPHONE ENCOUNTER
I would recommend taking aleve or ibuprofen for pain. I can see her today if she likes.  You can double book a physical

## 2023-11-09 ENCOUNTER — OFFICE VISIT (OUTPATIENT)
Dept: FAMILY MEDICINE CLINIC | Facility: CLINIC | Age: 58
End: 2023-11-09
Payer: COMMERCIAL

## 2023-11-09 VITALS
RESPIRATION RATE: 17 BRPM | BODY MASS INDEX: 46.1 KG/M2 | SYSTOLIC BLOOD PRESSURE: 130 MMHG | WEIGHT: 270 LBS | TEMPERATURE: 98.4 F | HEIGHT: 64 IN | OXYGEN SATURATION: 98 % | HEART RATE: 89 BPM | DIASTOLIC BLOOD PRESSURE: 78 MMHG

## 2023-11-09 DIAGNOSIS — K21.9 GASTROESOPHAGEAL REFLUX DISEASE WITHOUT ESOPHAGITIS: ICD-10-CM

## 2023-11-09 DIAGNOSIS — E66.01 MORBID (SEVERE) OBESITY DUE TO EXCESS CALORIES (HCC): ICD-10-CM

## 2023-11-09 DIAGNOSIS — G43.009 MIGRAINE WITHOUT AURA AND WITHOUT STATUS MIGRAINOSUS, NOT INTRACTABLE: ICD-10-CM

## 2023-11-09 DIAGNOSIS — M25.562 CHRONIC PAIN OF LEFT KNEE: Primary | ICD-10-CM

## 2023-11-09 DIAGNOSIS — G89.29 CHRONIC PAIN OF LEFT KNEE: Primary | ICD-10-CM

## 2023-11-09 PROCEDURE — 99214 OFFICE O/P EST MOD 30 MIN: CPT | Performed by: FAMILY MEDICINE

## 2023-11-09 RX ORDER — CELECOXIB 100 MG/1
100 CAPSULE ORAL 2 TIMES DAILY
Qty: 60 CAPSULE | Refills: 0 | Status: SHIPPED | OUTPATIENT
Start: 2023-11-09

## 2023-11-09 RX ORDER — METHYLPREDNISOLONE 4 MG/1
TABLET ORAL
Qty: 21 EACH | Refills: 0 | Status: SHIPPED | OUTPATIENT
Start: 2023-11-09

## 2023-11-09 NOTE — PROGRESS NOTES
Name: Cherie Smith      : 1965      MRN: [id number]  Encounter Provider: Dank London MD  Encounter Date: 2023   Encounter department: [facility]     1. Chronic pain of left knee  -     methylPREDNISolone 4 MG tablet therapy pack; Use as directed on package  -     XR knee 3 vw left non injury; Future; Expected date: 2023  -     Ambulatory referral to Orthopedic Surgery  -     Ambulatory Referral to Physical Therapy; Future  -     celecoxib (CeleBREX) 100 mg capsule; Take 1 capsule (100 mg total) by mouth 2 (two) times a day    2. Morbid (severe) obesity due to excess calories [facility])  Assessment & Plan:  Diet , exercise   Portion control       3. Migraine without aura and without status migrainosus, not intractable  Assessment & Plan:  stable      4. Gastroesophageal reflux disease without esophagitis  Assessment & Plan:  Stable on protonix             Subjective   Left knee pain for the last 3-5 years ago   Ongoing issues since then       Knee Pain   There was no injury mechanism. The pain is present in the left knee. The pain is moderate. The pain has been Worsening since onset. Associated symptoms include an inability to bear weight. Pertinent negatives include no loss of motion, loss of sensation, muscle weakness, numbness or tingling. She reports no foreign bodies present. The symptoms are aggravated by weight bearing. Review of Systems   Constitutional: Negative. HENT: Negative. Respiratory: Negative. Musculoskeletal:  Positive for arthralgias. Neurological:  Negative for tingling and numbness.        Past Medical History:   Diagnosis Date   • COVID-19 2021     Past Surgical History:   Procedure Laterality Date   • DILATION AND CURETTAGE OF UTERUS       Family History   Problem Relation Age of Onset   • Colon cancer Mother    • Hypertension Mother    • Endometrial cancer Mother    • Stomach cancer Mother    • Alcohol abuse Father    • Hypertension Sister    • Colon cancer Paternal Aunt    • Heart disease Family      Social History     Socioeconomic History   • Marital status: Single     Spouse name: None   • Number of children: None   • Years of education: None   • Highest education level: None   Occupational History   • None   Tobacco Use   • Smoking status: Never     Passive exposure: Never   • Smokeless tobacco: Never   • Tobacco comments:     former smoker quit 4 years ago. . smoked for at least 20 years. . half a pack to one pack of cigarettes a day   Vaping Use   • Vaping Use: Never used   Substance and Sexual Activity   • Alcohol use: No   • Drug use: No   • Sexual activity: Yes     Partners: Male   Other Topics Concern   • None   Social History Narrative   • None     Social Determinants of Health     Financial Resource Strain: Not on file   Food Insecurity: Not on file   Transportation Needs: Not on file   Physical Activity: Not on file   Stress: Not on file   Social Connections: Not on file   Intimate Partner Violence: Not on file   Housing Stability: Not on file     Current Outpatient Medications on File Prior to Visit   Medication Sig   • albuterol (PROVENTIL HFA,VENTOLIN HFA) 90 mcg/act inhaler Inhale 1-2 puffs every 6 (six) hours as needed for wheezing   • ALPRAZolam (XANAX) 0.5 mg tablet Take 1 tablet (0.5 mg total) by mouth daily at bedtime as needed for anxiety   • diclofenac sodium (VOLTAREN) 1 % Place on the skin   • fluticasone (Flovent HFA) 110 MCG/ACT inhaler Inhale 1 puff 2 (two) times a day Rinse mouth after use. • pantoprazole (PROTONIX) 40 mg tablet Take 1 tablet (40 mg total) by mouth daily   • [DISCONTINUED] meloxicam (MOBIC) 15 mg tablet Take 1 tablet (15 mg total) by mouth daily as needed for moderate pain   • Cholecalciferol (VITAMIN D3) 2000 units capsule Take 10,000 Units by mouth Take one capsule by mouth daily.  (Patient not taking: Reported on 11/9/2023)   • Probiotic Product (PROBIOTIC PO) Take by mouth (Patient not taking: Reported on 11/9/2023)     Allergies   Allergen Reactions   • Seasonal Ic  [Cholestatin]    • Sulfamethoxazole-Trimethoprim      Other reaction(s): yeast infection  Annotation - [id number]: reaction is rash     Immunization History   Administered Date(s) Administered   • H1N1, All Formulations 02/02/2010   • INFLUENZA 02/02/2010, 10/10/2017, 10/09/2023   • Influenza Quadrivalent Preservative Free 3 years and older IM 10/10/2017   • Influenza, recombinant, quadrivalent,injectable, preservative free 11/27/2020   • Influenza, seasonal, injectable 10/17/2013   • Pneumococcal Conjugate 13-Valent 11/27/2020   • Pneumococcal Conjugate Vaccine 20-valent (Pcv20), Polysace 07/11/2022   • Tdap 02/11/2013, 04/26/2023       Objective     /78 (BP Location: Left arm, Patient Position: Sitting, Cuff Size: Large)   Pulse 89   Temp 98.4 °F (36.9 °C) (Tympanic)   Resp 17   Ht 5' 3.54" (1.614 m)   Wt 122 kg (270 lb)   SpO2 98%   BMI 47.02 kg/m²     Physical Exam  Vitals reviewed. Cardiovascular:      Rate and Rhythm: Normal rate and regular rhythm. Pulses: Normal pulses. Heart sounds: Normal heart sounds. Pulmonary:      Effort: Pulmonary effort is normal.      Breath sounds: Normal breath sounds. Musculoskeletal:         General: Tenderness present. No swelling, deformity or signs of injury. Cervical back: Normal range of motion. Left lower leg: No edema. Neurological:      General: No focal deficit present. Mental Status: She is oriented to person, place, and time.    Psychiatric:         Mood and Affect: Mood normal.         Behavior: Behavior normal.       Dom Strickland MD

## 2023-11-27 ENCOUNTER — VBI (OUTPATIENT)
Dept: ADMINISTRATIVE | Facility: OTHER | Age: 58
End: 2023-11-27

## 2023-12-06 DIAGNOSIS — M25.562 CHRONIC PAIN OF LEFT KNEE: ICD-10-CM

## 2023-12-06 DIAGNOSIS — G89.29 CHRONIC PAIN OF LEFT KNEE: ICD-10-CM

## 2023-12-06 RX ORDER — CELECOXIB 100 MG/1
100 CAPSULE ORAL 2 TIMES DAILY
Qty: 60 CAPSULE | Refills: 2 | Status: SHIPPED | OUTPATIENT
Start: 2023-12-06

## 2023-12-28 ENCOUNTER — HOSPITAL ENCOUNTER (OUTPATIENT)
Dept: RADIOLOGY | Facility: HOSPITAL | Age: 58
Discharge: HOME | End: 2023-12-28
Payer: COMMERCIAL

## 2023-12-28 DIAGNOSIS — M25.562 CHRONIC PAIN OF LEFT KNEE: ICD-10-CM

## 2023-12-28 DIAGNOSIS — G89.29 CHRONIC PAIN OF LEFT KNEE: ICD-10-CM

## 2023-12-28 PROCEDURE — 73562 X-RAY EXAM OF KNEE 3: CPT

## 2024-01-04 ENCOUNTER — RA CDI HCC (OUTPATIENT)
Dept: OTHER | Facility: HOSPITAL | Age: 59
End: 2024-01-04

## 2024-01-04 NOTE — PROGRESS NOTES
HCC coding opportunities          Chart Reviewed number of suggestions sent to Provider: 2     Patients Insurance        Commercial Insurance: [employer]

## 2024-01-05 ENCOUNTER — OFFICE VISIT (OUTPATIENT)
Dept: FAMILY MEDICINE CLINIC | Facility: CLINIC | Age: 59
End: 2024-01-05
Payer: COMMERCIAL

## 2024-01-05 VITALS
TEMPERATURE: 98.4 F | OXYGEN SATURATION: 98 % | WEIGHT: 270 LBS | DIASTOLIC BLOOD PRESSURE: 80 MMHG | HEIGHT: 64 IN | SYSTOLIC BLOOD PRESSURE: 130 MMHG | RESPIRATION RATE: 19 BRPM | BODY MASS INDEX: 46.1 KG/M2 | HEART RATE: 94 BPM

## 2024-01-05 DIAGNOSIS — J45.20 MILD INTERMITTENT ASTHMA WITHOUT COMPLICATION: ICD-10-CM

## 2024-01-05 DIAGNOSIS — G89.29 CHRONIC PAIN OF LEFT KNEE: ICD-10-CM

## 2024-01-05 DIAGNOSIS — M25.562 CHRONIC PAIN OF LEFT KNEE: ICD-10-CM

## 2024-01-05 DIAGNOSIS — E66.01 MORBID (SEVERE) OBESITY DUE TO EXCESS CALORIES (HCC): ICD-10-CM

## 2024-01-05 DIAGNOSIS — Z13.220 LIPID SCREENING: ICD-10-CM

## 2024-01-05 DIAGNOSIS — K21.9 GASTROESOPHAGEAL REFLUX DISEASE WITHOUT ESOPHAGITIS: Primary | ICD-10-CM

## 2024-01-05 DIAGNOSIS — M17.0 ARTHRITIS OF BOTH KNEES: ICD-10-CM

## 2024-01-05 PROCEDURE — 99214 OFFICE O/P EST MOD 30 MIN: CPT | Performed by: FAMILY MEDICINE

## 2024-01-05 RX ORDER — CELECOXIB 100 MG/1
100 CAPSULE ORAL 2 TIMES DAILY
Qty: 60 CAPSULE | Refills: 2 | Status: SHIPPED | OUTPATIENT
Start: 2024-01-05

## 2024-01-05 RX ORDER — METHYLPREDNISOLONE 4 MG/1
TABLET ORAL
Qty: 21 EACH | Refills: 0 | Status: SHIPPED | OUTPATIENT
Start: 2024-01-05

## 2024-01-05 NOTE — PROGRESS NOTES
Name: Riana Jeronimo      : 1965      MRN: [id number]  Encounter Provider: Melisa George MD  Encounter Date: 2024   Encounter department: [facility]    Assessment & Plan     1. Gastroesophageal reflux disease without esophagitis  Assessment & Plan:  Doing well on protonix as needed      2. Chronic pain of left knee  Assessment & Plan:  Recent medrol dos pack helped but now the pain came back again   She wants to try another round to help     Orders:  -     methylPREDNISolone 4 MG tablet therapy pack; Use as directed on package  -     celecoxib (CeleBREX) 100 mg capsule; Take 1 capsule (100 mg total) by mouth 2 (two) times a day    3. Arthritis of both knees  Assessment & Plan:  Not interested in seeing ortho at this time    Orders:  -     CBC and differential    4. Mild intermittent asthma without complication  Assessment & Plan:  Albuterol PRN  Not on flovent at this time       5. Lipid screening  -     Comprehensive metabolic panel  -     Lipid panel    6. Morbid (severe) obesity due to excess calories (HCC)  -     TSH, 3rd generation with Free T4 reflex; Future           Subjective     HPI  Here for follow up  Continued to have back pain , left knee pain almost daily   Struggling with losing weight       Review of Systems   Constitutional: Negative.    HENT: Negative.     Musculoskeletal:  Positive for arthralgias, gait problem and joint swelling. Negative for back pain.   Hematological: Negative.    Psychiatric/Behavioral: Negative.         Past Medical History:   Diagnosis Date   • COVID-19 2021     Past Surgical History:   Procedure Laterality Date   • DILATION AND CURETTAGE OF UTERUS       Family History   Problem Relation Age of Onset   • Colon cancer Mother    • Hypertension Mother    • Endometrial cancer Mother    • Stomach cancer Mother    • Alcohol abuse Father    • Hypertension Sister    • Colon cancer Paternal Aunt    • Heart disease Family      Social History      Socioeconomic History   • Marital status: Single     Spouse name: None   • Number of children: None   • Years of education: None   • Highest education level: None   Occupational History   • None   Tobacco Use   • Smoking status: Never     Passive exposure: Never   • Smokeless tobacco: Never   • Tobacco comments:     former smoker quit 4 years ago.. smoked for at least 20 years.. half a pack to one pack of cigarettes a day   Vaping Use   • Vaping status: Never Used   Substance and Sexual Activity   • Alcohol use: No   • Drug use: No   • Sexual activity: Yes     Partners: Male   Other Topics Concern   • None   Social History Narrative   • None     Social Determinants of Health     Financial Resource Strain: Not on file   Food Insecurity: Not on file   Transportation Needs: Not on file   Physical Activity: Not on file   Stress: Not on file   Social Connections: Not on file   Intimate Partner Violence: Not on file   Housing Stability: Not on file     Current Outpatient Medications on File Prior to Visit   Medication Sig   • albuterol (PROVENTIL HFA,VENTOLIN HFA) 90 mcg/act inhaler Inhale 1-2 puffs every 6 (six) hours as needed for wheezing   • ALPRAZolam (XANAX) 0.5 mg tablet Take 1 tablet (0.5 mg total) by mouth daily at bedtime as needed for anxiety   • diclofenac sodium (VOLTAREN) 1 % Place on the skin   • fluticasone (Flovent HFA) 110 MCG/ACT inhaler Inhale 1 puff 2 (two) times a day Rinse mouth after use.   • pantoprazole (PROTONIX) 40 mg tablet Take 1 tablet (40 mg total) by mouth daily   • [DISCONTINUED] celecoxib (CeleBREX) 100 mg capsule TAKE 1 CAPSULE BY MOUTH TWICE A DAY   • [DISCONTINUED] Cholecalciferol (VITAMIN D3) 2000 units capsule Take 10,000 Units by mouth Take one capsule by mouth daily. (Patient not taking: Reported on 11/9/2023)   • [DISCONTINUED] methylPREDNISolone 4 MG tablet therapy pack Use as directed on package (Patient not taking: Reported on 1/5/2024)   • [DISCONTINUED] Probiotic Product  "(PROBIOTIC PO) Take by mouth (Patient not taking: Reported on 11/9/2023)     Allergies   Allergen Reactions   • Seasonal Ic  [Cholestatin]    • Sulfamethoxazole-Trimethoprim      Other reaction(s): yeast infection  Annotation - [id number]: reaction is rash     Immunization History   Administered Date(s) Administered   • H1N1, All Formulations 02/02/2010   • INFLUENZA 02/02/2010, 10/10/2017, 10/09/2023   • Influenza Quadrivalent Preservative Free 3 years and older IM 10/10/2017   • Influenza, recombinant, quadrivalent,injectable, preservative free 11/27/2020   • Influenza, seasonal, injectable 10/17/2013   • Pneumococcal Conjugate 13-Valent 11/27/2020   • Pneumococcal Conjugate Vaccine 20-valent (Pcv20), Polysace 07/11/2022   • Tdap 02/11/2013, 04/26/2023       Objective     /80 (BP Location: Left arm, Patient Position: Sitting, Cuff Size: Standard)   Pulse 94   Temp 98.4 °F (36.9 °C) (Tympanic)   Resp 19   Ht 5' 3.54\" (1.614 m)   Wt 122 kg (270 lb)   SpO2 98%   BMI 47.02 kg/m²     Physical Exam  Vitals and nursing note reviewed.   Constitutional:       Appearance: Normal appearance.   Cardiovascular:      Rate and Rhythm: Normal rate and regular rhythm.      Pulses: Normal pulses.      Heart sounds: Normal heart sounds.   Pulmonary:      Effort: Pulmonary effort is normal.      Breath sounds: Normal breath sounds.   Neurological:      General: No focal deficit present.      Mental Status: She is alert and oriented to person, place, and time.   Psychiatric:         Mood and Affect: Mood normal.         Behavior: Behavior normal.       Melisa George MD    "

## 2024-01-05 NOTE — ASSESSMENT & PLAN NOTE
Recent medrol dos pack helped but now the pain came back again   She wants to try another round to help

## 2024-02-12 ENCOUNTER — TELEPHONE (OUTPATIENT)
Age: 59
End: 2024-02-12

## 2024-02-13 ENCOUNTER — TELEMEDICINE (OUTPATIENT)
Dept: FAMILY MEDICINE CLINIC | Facility: CLINIC | Age: 59
End: 2024-02-13
Payer: COMMERCIAL

## 2024-02-13 DIAGNOSIS — G43.109 MIGRAINE WITH AURA AND WITHOUT STATUS MIGRAINOSUS, NOT INTRACTABLE: Primary | ICD-10-CM

## 2024-02-13 DIAGNOSIS — E66.01 MORBID (SEVERE) OBESITY DUE TO EXCESS CALORIES (HCC): ICD-10-CM

## 2024-02-13 DIAGNOSIS — K21.9 GASTROESOPHAGEAL REFLUX DISEASE WITHOUT ESOPHAGITIS: ICD-10-CM

## 2024-02-13 PROCEDURE — 99214 OFFICE O/P EST MOD 30 MIN: CPT | Performed by: FAMILY MEDICINE

## 2024-02-13 NOTE — PROGRESS NOTES
Virtual Regular Visit    Verification of patient location:    Patient is located at Home in the following state in which I hold an active license [place]      Assessment/Plan:    Problem List Items Addressed This Visit        Digestive    Esophageal reflux     Protonix PRN             Cardiovascular and Mediastinum    Migraine, unspecified, not intractable, without status migrainosus - Primary     Improving   Continue to monitor  Work note provided for her            Other    Morbid (severe) obesity due to excess calories (HCC)     Diet, exercise, portion control            Riana was seen today for follow-up and virtual regular visit.    Diagnoses and all orders for this visit:    Migraine with aura and without status migrainosus, not intractable  Comments:  improving on excedrin   continue to monitor   work note given today    Morbid (severe) obesity due to excess calories (HCC)  Comments:  diet, exercise and portion control    Gastroesophageal reflux disease without esophagitis  Comments:  stable on protonix prn            Reason for visit is   Chief Complaint   Patient presents with   • Follow-up     Patient being seen for follow up on migraines   • Virtual Regular Visit          Encounter provider Meilsa George MD    Provider located at [address] [place] [id number]      Recent Visits  No visits were found meeting these conditions.  Showing recent visits within past 7 days and meeting all other requirements  Today's Visits  Date Type Provider Dept   02/13/24 Telemedicine Melisa George MD  Benji Ortega    Showing today's visits and meeting all other requirements  Future Appointments  No visits were found meeting these conditions.  Showing future appointments within next 150 days and meeting all other requirements       The patient was identified by name and date of birth. Riana Jeronimo was informed that this is a telemedicine visit and that the visit  is being conducted through the [employer] platform. She agrees to proceed..  My office door was closed. No one else was in the room.  She acknowledged consent and understanding of privacy and security of the video platform. The patient has agreed to participate and understands they can discontinue the visit at any time.    Patient is aware this is a billable service.     Subjective  Riana Jeronimo is a 59 y.o. female for follow up migraines  .  Worsening migraines for the last 2 days   Excedrin helped and feeling better today    Migraine  This is a recurrent problem. The current episode started yesterday. The problem occurs constantly. The problem has been gradually worsening since onset. The pain is present in the frontal. The pain radiates to the right neck. The pain quality is similar to prior headaches. Associated symptoms include nausea. Pertinent negatives include no abdominal pain, abnormal behavior, anorexia, back pain, blurred vision, coughing, diarrhea, dizziness, drainage, ear pain, eye watering, facial sweating, fever, hearing loss, insomnia, loss of balance, muscle aches, neck pain, numbness, phonophobia, rhinorrhea, seizures, sinus pressure, sore throat, swollen glands, tingling, tinnitus, visual change, vomiting or weakness. Past treatments include NSAIDs and Excedrin. The treatment provided moderate relief. Her past medical history is significant for migraine headaches. There is no history of intracranial lesions or a seizure disorder.        Past Medical History:   Diagnosis Date   • COVID-19 1/21/2021       Past Surgical History:   Procedure Laterality Date   • DILATION AND CURETTAGE OF UTERUS         Current Outpatient Medications   Medication Sig Dispense Refill   • albuterol (PROVENTIL HFA,VENTOLIN HFA) 90 mcg/act inhaler Inhale 1-2 puffs every 6 (six) hours as needed for wheezing 18 g 0   • ALPRAZolam (XANAX) 0.5 mg tablet Take 1 tablet (0.5 mg total) by mouth daily at bedtime as needed for  anxiety 30 tablet 0   • celecoxib (CeleBREX) 100 mg capsule Take 1 capsule (100 mg total) by mouth 2 (two) times a day 60 capsule 2   • diclofenac sodium (VOLTAREN) 1 % Place on the skin     • fluticasone (Flovent HFA) 110 MCG/ACT inhaler Inhale 1 puff 2 (two) times a day Rinse mouth after use. 12 g 0   • pantoprazole (PROTONIX) 40 mg tablet Take 1 tablet (40 mg total) by mouth daily 30 tablet 2     No current facility-administered medications for this visit.        Allergies   Allergen Reactions   • Seasonal Ic  [Cholestatin]    • Sulfamethoxazole-Trimethoprim      Other reaction(s): yeast infection  Annotation - [id number]: reaction is rash       Review of Systems   Constitutional:  Negative for fever.   HENT:  Negative for ear pain, hearing loss, rhinorrhea, sinus pressure, sore throat and tinnitus.    Eyes:  Negative for blurred vision.   Respiratory:  Negative for cough.    Gastrointestinal:  Positive for nausea. Negative for abdominal pain, anorexia, diarrhea and vomiting.   Musculoskeletal:  Negative for back pain and neck pain.   Neurological:  Positive for headaches. Negative for dizziness, tingling, seizures, weakness, numbness and loss of balance.   Psychiatric/Behavioral:  The patient does not have insomnia.        Video Exam    There were no vitals filed for this visit.    Physical Exam  Constitutional:       Appearance: Normal appearance.   Pulmonary:      Effort: Pulmonary effort is normal.   Neurological:      General: No focal deficit present.      Mental Status: She is alert.   Psychiatric:         Mood and Affect: Mood normal.          Visit Time  Total Visit Duration: 20 minutes

## 2024-03-27 ENCOUNTER — VBI (OUTPATIENT)
Dept: ADMINISTRATIVE | Facility: OTHER | Age: 59
End: 2024-03-27

## 2024-04-11 ENCOUNTER — VBI (OUTPATIENT)
Dept: ADMINISTRATIVE | Facility: OTHER | Age: 59
End: 2024-04-11

## 2024-04-11 NOTE — TELEPHONE ENCOUNTER
04/11/24 1:18 PM     [employer] SmartForm used to document caregap status.  Chart notes state hysterectomy, no supporting documentation     Remedios Hardy

## 2024-04-23 ENCOUNTER — HOSPITAL ENCOUNTER (OUTPATIENT)
Dept: MAMMOGRAPHY | Facility: HOSPITAL | Age: 59
Discharge: HOME | End: 2024-04-23
Payer: COMMERCIAL

## 2024-04-23 VITALS — BODY MASS INDEX: 45.92 KG/M2 | HEIGHT: 64 IN | WEIGHT: 268.96 LBS

## 2024-04-23 DIAGNOSIS — Z12.31 VISIT FOR SCREENING MAMMOGRAM: ICD-10-CM

## 2024-04-23 PROCEDURE — 77063 BREAST TOMOSYNTHESIS BI: CPT

## 2024-04-23 PROCEDURE — 77067 SCR MAMMO BI INCL CAD: CPT

## 2024-04-29 ENCOUNTER — OFFICE VISIT (OUTPATIENT)
Dept: FAMILY MEDICINE CLINIC | Facility: CLINIC | Age: 59
End: 2024-04-29
Payer: COMMERCIAL

## 2024-04-29 VITALS
BODY MASS INDEX: 41.82 KG/M2 | WEIGHT: 236 LBS | RESPIRATION RATE: 16 BRPM | DIASTOLIC BLOOD PRESSURE: 80 MMHG | HEIGHT: 63 IN | TEMPERATURE: 98.1 F | OXYGEN SATURATION: 97 % | HEART RATE: 81 BPM | SYSTOLIC BLOOD PRESSURE: 138 MMHG

## 2024-04-29 DIAGNOSIS — J01.00 ACUTE MAXILLARY SINUSITIS, RECURRENCE NOT SPECIFIED: Primary | ICD-10-CM

## 2024-04-29 DIAGNOSIS — J45.20 MILD INTERMITTENT ASTHMA WITHOUT COMPLICATION: ICD-10-CM

## 2024-04-29 DIAGNOSIS — K21.9 GASTROESOPHAGEAL REFLUX DISEASE WITHOUT ESOPHAGITIS: ICD-10-CM

## 2024-04-29 PROCEDURE — 99214 OFFICE O/P EST MOD 30 MIN: CPT | Performed by: FAMILY MEDICINE

## 2024-04-29 RX ORDER — AMOXICILLIN AND CLAVULANATE POTASSIUM 875; 125 MG/1; MG/1
1 TABLET, FILM COATED ORAL EVERY 12 HOURS SCHEDULED
Qty: 20 TABLET | Refills: 0 | Status: SHIPPED | OUTPATIENT
Start: 2024-04-29 | End: 2024-05-09

## 2024-04-29 NOTE — PROGRESS NOTES
Name: Riana Jeronimo      : 1965      MRN: [id number]  Encounter Provider: Melisa George MD  Encounter Date: 2024   Encounter department: [facility]    Assessment & Plan     1. Acute maxillary sinusitis, recurrence not specified  -     amoxicillin-clavulanate (AUGMENTIN) 875-125 mg per tablet; Take 1 tablet by mouth every 12 (twelve) hours for 10 days    2. Mild intermittent asthma without complication  Assessment & Plan:  Worsening since sinus symptoms   To start flovent daily again       3. Gastroesophageal reflux disease without esophagitis  Assessment & Plan:  Takes protonix 1-2 x a month             Subjective     URI   This is a new problem. The current episode started 1 to 4 weeks ago. There has been no fever. Associated symptoms include congestion, coughing, rhinorrhea and sinus pain. Pertinent negatives include no abdominal pain, chest pain, diarrhea, dysuria, ear pain, headaches, joint pain, joint swelling, nausea, neck pain, plugged ear sensation, rash, sneezing, sore throat, swollen glands, vomiting or wheezing. Treatments tried: flonase. The treatment provided mild relief.     Review of Systems   Constitutional: Negative.    HENT:  Positive for congestion, rhinorrhea and sinus pain. Negative for ear pain, sneezing and sore throat.    Eyes: Negative.    Respiratory:  Positive for cough. Negative for wheezing.    Cardiovascular:  Negative for chest pain.   Gastrointestinal:  Negative for abdominal pain, diarrhea, nausea and vomiting.   Genitourinary:  Negative for dysuria.   Musculoskeletal: Negative.  Negative for joint pain and neck pain.   Skin: Negative.  Negative for rash.   Neurological:  Negative for headaches.       Past Medical History:   Diagnosis Date   • COVID-19 2021     Past Surgical History:   Procedure Laterality Date   • DILATION AND CURETTAGE OF UTERUS       Family History   Problem Relation Age of Onset   • Colon cancer Mother    • Hypertension  Mother    • Endometrial cancer Mother    • Stomach cancer Mother    • Alcohol abuse Father    • Hypertension Sister    • No Known Problems Daughter    • Colon cancer Paternal Aunt    • Heart disease Family    • No Known Problems Son      Social History     Socioeconomic History   • Marital status: Single     Spouse name: None   • Number of children: None   • Years of education: None   • Highest education level: None   Occupational History   • None   Tobacco Use   • Smoking status: Former     Types: Cigarettes     Passive exposure: Never   • Smokeless tobacco: Never   • Tobacco comments:     former smoker quit 4 years ago.. smoked for at least 20 years.. half a pack to one pack of cigarettes a day   Vaping Use   • Vaping status: Never Used   Substance and Sexual Activity   • Alcohol use: No   • Drug use: No   • Sexual activity: Yes     Partners: Male   Other Topics Concern   • None   Social History Narrative   • None     Social Determinants of Health     Financial Resource Strain: Not on file   Food Insecurity: Not on file   Transportation Needs: Not on file   Physical Activity: Not on file   Stress: Not on file   Social Connections: Not on file   Intimate Partner Violence: Not on file   Housing Stability: Not on file     Current Outpatient Medications on File Prior to Visit   Medication Sig   • albuterol (PROVENTIL HFA,VENTOLIN HFA) 90 mcg/act inhaler Inhale 1-2 puffs every 6 (six) hours as needed for wheezing   • ALPRAZolam (XANAX) 0.5 mg tablet Take 1 tablet (0.5 mg total) by mouth daily at bedtime as needed for anxiety   • celecoxib (CeleBREX) 100 mg capsule Take 1 capsule (100 mg total) by mouth 2 (two) times a day   • diclofenac sodium (VOLTAREN) 1 % Place on the skin   • fluticasone (Flovent HFA) 110 MCG/ACT inhaler Inhale 1 puff 2 (two) times a day Rinse mouth after use.   • pantoprazole (PROTONIX) 40 mg tablet Take 1 tablet (40 mg total) by mouth daily     Allergies   Allergen Reactions   • Seasonal Ic   "[Cholestatin]    • Sulfamethoxazole-Trimethoprim      Other reaction(s): yeast infection  Annotation - [id number]: reaction is rash     Immunization History   Administered Date(s) Administered   • H1N1, All Formulations 02/02/2010   • INFLUENZA 02/02/2010, 10/10/2017, 10/09/2023   • Influenza Quadrivalent Preservative Free 3 years and older IM 10/10/2017   • Influenza, recombinant, quadrivalent,injectable, preservative free 11/27/2020   • Influenza, seasonal, injectable 10/17/2013   • Pneumococcal Conjugate 13-Valent 11/27/2020   • Pneumococcal Conjugate Vaccine 20-valent (Pcv20), Polysace 07/11/2022   • Tdap 02/11/2013, 04/26/2023       Objective     /80 (BP Location: Left arm, Patient Position: Sitting, Cuff Size: Extra-Large)   Pulse 81   Temp 98.1 °F (36.7 °C) (Tympanic)   Resp 16   Ht 5' 3\" (1.6 m)   Wt 107 kg (236 lb)   SpO2 97%   BMI 41.81 kg/m²     Physical Exam  Constitutional:       Appearance: Normal appearance. She is obese.   HENT:      Right Ear: Tympanic membrane normal.      Left Ear: Tympanic membrane normal.      Nose: Congestion and rhinorrhea present.      Mouth/Throat:      Pharynx: No oropharyngeal exudate or posterior oropharyngeal erythema.   Cardiovascular:      Rate and Rhythm: Normal rate and regular rhythm.      Pulses: Normal pulses.      Heart sounds: Normal heart sounds.   Pulmonary:      Effort: Pulmonary effort is normal.      Breath sounds: Normal breath sounds.   Neurological:      Mental Status: She is alert.       Melisa George MD    "

## 2024-05-07 ENCOUNTER — VBI (OUTPATIENT)
Dept: ADMINISTRATIVE | Facility: OTHER | Age: 59
End: 2024-05-07

## 2024-05-09 ENCOUNTER — TELEPHONE (OUTPATIENT)
Dept: MAMMOGRAPHY | Facility: CLINIC | Age: 59
End: 2024-05-09

## 2024-05-09 NOTE — TELEPHONE ENCOUNTER
We were unsuccessful in contacting the above patient for her diagnostic follow up mammogram/ultrasound.  I have left messages for her on 4/25, 5/2 and 5/9 with no response to schedule.      Please attempt to contact this patient and have them schedule their appointment.  Please let me know if you have any questions or if I can be of any further assistance.    Thank you,  Paris Castillo RN, BSN  Breast Nurse Navigator

## 2024-05-10 ENCOUNTER — TELEPHONE (OUTPATIENT)
Dept: MAMMOGRAPHY | Facility: CLINIC | Age: 59
End: 2024-05-10

## 2024-05-10 NOTE — TELEPHONE ENCOUNTER
Left message with contact info including breast care line # for pt to call back to schedule dx breast imaging.

## 2024-05-16 ENCOUNTER — TELEPHONE (OUTPATIENT)
Age: 59
End: 2024-05-16

## 2024-05-16 DIAGNOSIS — N76.0 VULVOVAGINITIS: Primary | ICD-10-CM

## 2024-05-16 RX ORDER — FLUCONAZOLE 150 MG/1
150 TABLET ORAL ONCE
Qty: 1 TABLET | Refills: 0 | Status: SHIPPED | OUTPATIENT
Start: 2024-05-16 | End: 2024-05-16

## 2024-05-16 NOTE — TELEPHONE ENCOUNTER
Patient called to request medication to be prescribed for a yeast infection after taking antibiotics.  Patient was prescribed   Amoxicillin-Pot Clavulanate 875-125 mg, which she took for 10 days.  Patient stated towards the last few days of taking the medication, she began to have vaginal itching and burning.  She has had the symptoms for  about 1 week now and they are beginning to worsen.  She would like to know if the pills for a yeast infection can be prescribed.  Patient also wanted to let Dr. George know that she has been trying to [facility] but has not been able to reach her.  She is off from work today and will try to call her again to schedule the diagnostic imaging.

## 2024-05-16 NOTE — TELEPHONE ENCOUNTER
I sent over diflucan for her     Paris: please see message above - patient is trying to call back to schedule

## 2024-07-08 ENCOUNTER — HOSPITAL ENCOUNTER (OUTPATIENT)
Dept: MAMMOGRAPHY | Facility: CLINIC | Age: 59
Discharge: HOME | End: 2024-07-08
Payer: COMMERCIAL

## 2024-07-08 ENCOUNTER — HOSPITAL ENCOUNTER (OUTPATIENT)
Dept: ULTRASOUND IMAGING | Facility: CLINIC | Age: 59
Discharge: HOME | End: 2024-07-08
Payer: COMMERCIAL

## 2024-07-08 VITALS — HEIGHT: 63 IN | BODY MASS INDEX: 47.84 KG/M2 | WEIGHT: 270 LBS

## 2024-07-08 DIAGNOSIS — R92.8 ABNORMAL MAMMOGRAM: ICD-10-CM

## 2024-07-08 PROCEDURE — 77065 DX MAMMO INCL CAD UNI: CPT

## 2024-07-08 PROCEDURE — G0279 TOMOSYNTHESIS, MAMMO: HCPCS

## 2024-07-08 PROCEDURE — 76642 ULTRASOUND BREAST LIMITED: CPT

## 2024-07-11 ENCOUNTER — APPOINTMENT (OUTPATIENT)
Dept: LAB | Facility: CLINIC | Age: 59
End: 2024-07-11
Payer: COMMERCIAL

## 2024-07-11 DIAGNOSIS — E66.01 MORBID (SEVERE) OBESITY DUE TO EXCESS CALORIES (HCC): ICD-10-CM

## 2024-07-11 LAB
ALBUMIN SERPL BCG-MCNC: 4.3 G/DL (ref 3.5–5)
ALP SERPL-CCNC: 87 U/L (ref 34–104)
ALT SERPL W P-5'-P-CCNC: 40 U/L (ref 7–52)
ANION GAP SERPL CALCULATED.3IONS-SCNC: 7 MMOL/L (ref 4–13)
AST SERPL W P-5'-P-CCNC: 23 U/L (ref 13–39)
BASOPHILS # BLD AUTO: 0.04 THOUSANDS/ÂΜL (ref 0–0.1)
BASOPHILS NFR BLD AUTO: 1 % (ref 0–1)
BILIRUB SERPL-MCNC: 0.47 MG/DL (ref 0.2–1)
BUN SERPL-MCNC: 12 MG/DL (ref 5–25)
CALCIUM SERPL-MCNC: 9.2 MG/DL (ref 8.4–10.2)
CHLORIDE SERPL-SCNC: 103 MMOL/L (ref 96–108)
CHOLEST SERPL-MCNC: 185 MG/DL
CO2 SERPL-SCNC: 30 MMOL/L (ref 21–32)
CREAT SERPL-MCNC: 0.9 MG/DL (ref 0.6–1.3)
EOSINOPHIL # BLD AUTO: 0.15 THOUSAND/ÂΜL (ref 0–0.61)
EOSINOPHIL NFR BLD AUTO: 2 % (ref 0–6)
ERYTHROCYTE [DISTWIDTH] IN BLOOD BY AUTOMATED COUNT: 13.5 % (ref 11.6–15.1)
GFR SERPL CREATININE-BSD FRML MDRD: 70 ML/MIN/1.73SQ M
GLUCOSE P FAST SERPL-MCNC: 99 MG/DL (ref 65–99)
HCT VFR BLD AUTO: 39.9 % (ref 34.8–46.1)
HDLC SERPL-MCNC: 56 MG/DL
HGB BLD-MCNC: 12.8 G/DL (ref 11.5–15.4)
IMM GRANULOCYTES # BLD AUTO: 0.02 THOUSAND/UL (ref 0–0.2)
IMM GRANULOCYTES NFR BLD AUTO: 0 % (ref 0–2)
LDLC SERPL CALC-MCNC: 109 MG/DL (ref 0–100)
LYMPHOCYTES # BLD AUTO: 1.71 THOUSANDS/ÂΜL (ref 0.6–4.47)
LYMPHOCYTES NFR BLD AUTO: 24 % (ref 14–44)
MCH RBC QN AUTO: 29.4 PG (ref 26.8–34.3)
MCHC RBC AUTO-ENTMCNC: 32.1 G/DL (ref 31.4–37.4)
MCV RBC AUTO: 92 FL (ref 82–98)
MONOCYTES # BLD AUTO: 0.45 THOUSAND/ÂΜL (ref 0.17–1.22)
MONOCYTES NFR BLD AUTO: 6 % (ref 4–12)
NEUTROPHILS # BLD AUTO: 4.72 THOUSANDS/ÂΜL (ref 1.85–7.62)
NEUTS SEG NFR BLD AUTO: 67 % (ref 43–75)
NONHDLC SERPL-MCNC: 129 MG/DL
NRBC BLD AUTO-RTO: 0 /100 WBCS
PLATELET # BLD AUTO: 260 THOUSANDS/UL (ref 149–390)
PMV BLD AUTO: 9.7 FL (ref 8.9–12.7)
POTASSIUM SERPL-SCNC: 4 MMOL/L (ref 3.5–5.3)
PROT SERPL-MCNC: 7.3 G/DL (ref 6.4–8.4)
RBC # BLD AUTO: 4.35 MILLION/UL (ref 3.81–5.12)
SODIUM SERPL-SCNC: 140 MMOL/L (ref 135–147)
TRIGL SERPL-MCNC: 101 MG/DL
TSH SERPL DL<=0.05 MIU/L-ACNC: 2.33 UIU/ML (ref 0.45–4.5)
WBC # BLD AUTO: 7.09 THOUSAND/UL (ref 4.31–10.16)

## 2024-07-11 PROCEDURE — 36415 COLL VENOUS BLD VENIPUNCTURE: CPT

## 2024-07-11 PROCEDURE — 84443 ASSAY THYROID STIM HORMONE: CPT

## 2024-07-12 ENCOUNTER — APPOINTMENT (OUTPATIENT)
Dept: RADIOLOGY | Age: 59
End: 2024-07-12
Payer: COMMERCIAL

## 2024-07-12 ENCOUNTER — OFFICE VISIT (OUTPATIENT)
Dept: URGENT CARE | Age: 59
End: 2024-07-12
Payer: COMMERCIAL

## 2024-07-12 VITALS
DIASTOLIC BLOOD PRESSURE: 86 MMHG | TEMPERATURE: 98 F | HEART RATE: 77 BPM | OXYGEN SATURATION: 98 % | SYSTOLIC BLOOD PRESSURE: 131 MMHG | RESPIRATION RATE: 20 BRPM
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
## 2024-07-12 DIAGNOSIS — M25.561 ACUTE PAIN OF RIGHT KNEE: ICD-10-CM

## 2024-07-12 DIAGNOSIS — M25.561 ACUTE PAIN OF RIGHT KNEE: Primary | ICD-10-CM

## 2024-07-12 DIAGNOSIS — T07.XXXA ABRASIONS OF MULTIPLE SITES: ICD-10-CM

## 2024-07-12 PROCEDURE — 73564 X-RAY EXAM KNEE 4 OR MORE: CPT

## 2024-07-12 PROCEDURE — G0383 LEV 4 HOSP TYPE B ED VISIT: HCPCS | Performed by: FAMILY MEDICINE

## 2024-07-12 PROCEDURE — S9083 URGENT CARE CENTER GLOBAL: HCPCS | Performed by: FAMILY MEDICINE

## 2024-07-12 NOTE — PATIENT INSTRUCTIONS
Patient instructions:  No prolonged standing greater than 20-30 minutes  No kneeling/bending greater than 5 times an hour  No pushing/pulling/carrying greater than 20 lbs.      Please ice affected areas, keep would clean and dry.  Dress the wound until scab form.  Wear right knee compression for support while walking.  Please follow up with PCP in 10 days if symptoms do not improve.    Please await official radiology results of right knee xray.

## 2024-07-12 NOTE — PROGRESS NOTES
[facility]        NAME: Riana Jeronimo is a 59 y.o. female  : 1965    MRN: [id number]  DATE: 2024  TIME: 9:02 PM    Assessment and Plan   Acute pain of right knee [M25.561]  1. Acute pain of right knee  XR knee 4+ vw right injury      2. Abrasions of multiple sites        3. Fall, initial encounter          Right knee xray: prelim read-no patellar fracture, +effusion - will await official read  Applied dressing to right knee wound, and then right knee brace  Discussed restrictions with pt  Work note written    Patient Instructions     Follow up with PCP in 3-5 days if symptoms worsen.    If tests have been performed at [facility], our office will contact you with results if changes need to be made to the care plan discussed with you at the visit.  [facility]    Chief Complaint     Chief Complaint   Patient presents with   • Fall     Patient with arthritis of both legs states that she tripped twice this week and has injuries relating to that. Last weekend she fell into her house injuring the right shin and has a bruise without residual pain. Today she tripped over a speed bump and scraped the right knee. She has b/l wrist pain from bracing herself but the right is worse causing weakness of the wrist. She notes RL back pain as well. She has severe pain overall and took pain medicine around 1700.      History of Present Illness   HPI  Riana Jeronimo is a 59 y.o. female  who presented to the [facility] today today accompanied by her .  Pt states that she fell about 2 1/2 hours ago and tripped on a speed bump in front of the grocery *1.  She fell right onto both hands and knees.  Currently the right knee and right hand are most painful.  Also has some associated pain in the right buttocks.  She took two tablets of Tylenol/Ibuprofen.    + h/o Arthritis in her knees  Previous to the fall she did not have associated headache or  dizziness, it was a mechanical fall. No LOC, or head trauma.  Pain in right knee is a 6/10 right now.  Review of Systems   Review of Systems   Constitutional:  Negative for chills and fever.   HENT:  Negative for congestion, rhinorrhea and sore throat.    Respiratory:  Negative for cough and shortness of breath.    Cardiovascular:  Negative for chest pain.   Gastrointestinal:  Negative for diarrhea, nausea and vomiting.   Skin:  Positive for color change and wound. Negative for rash.   Neurological:  Negative for dizziness and headaches.     Current Medications       Current Outpatient Medications:   •  albuterol (PROVENTIL HFA,VENTOLIN HFA) 90 mcg/act inhaler, Inhale 1-2 puffs every 6 (six) hours as needed for wheezing, Disp: 18 g, Rfl: 0  •  ALPRAZolam (XANAX) 0.5 mg tablet, Take 1 tablet (0.5 mg total) by mouth daily at bedtime as needed for anxiety, Disp: 30 tablet, Rfl: 0  •  celecoxib (CeleBREX) 100 mg capsule, Take 1 capsule (100 mg total) by mouth 2 (two) times a day, Disp: 60 capsule, Rfl: 2  •  diclofenac sodium (VOLTAREN) 1 %, Place on the skin, Disp: , Rfl:   •  fluticasone (Flovent HFA) 110 MCG/ACT inhaler, Inhale 1 puff 2 (two) times a day Rinse mouth after use., Disp: 12 g, Rfl: 0  •  pantoprazole (PROTONIX) 40 mg tablet, Take 1 tablet (40 mg total) by mouth daily, Disp: 30 tablet, Rfl: 2    Current Allergies     Allergies as of 07/12/2024 - Reviewed 07/12/2024   Allergen Reaction Noted   • Seasonal ic  [cholestatin]  04/25/2014   • Sulfamethoxazole-trimethoprim  05/02/2012            The following portions of the patient's history were reviewed and updated as appropriate: allergies, current medications, past family history, past medical history, past social history, past surgical history and problem list.     Past Medical History:   Diagnosis Date   • COVID-19 1/21/2021       Past Surgical History:   Procedure Laterality Date   • DILATION AND CURETTAGE OF UTERUS         Family History   Problem  Relation Age of Onset   • Colon cancer Mother    • Hypertension Mother    • Endometrial cancer Mother    • Stomach cancer Mother    • Alcohol abuse Father    • Hypertension Sister    • No Known Problems Daughter    • Colon cancer Paternal Aunt    • Heart disease Family    • No Known Problems Son      Medications have been verified.    Objective   /86   Pulse 77   Temp 98 °F (36.7 °C)   Resp 20   SpO2 98%   No LMP recorded. Patient is postmenopausal.       Physical Exam     Physical Exam  Vitals and nursing note reviewed.   Constitutional:       Appearance: She is well-developed. She is obese.   HENT:      Head: Normocephalic and atraumatic.   Cardiovascular:      Rate and Rhythm: Normal rate.   Pulmonary:      Effort: Pulmonary effort is normal. No respiratory distress.   Musculoskeletal:      Comments: Bilateral hand: + thenar eminence swelling and tenderness   Skin:     Findings: Abrasion and wound present.      Comments: Right knee: + swelling, + tenderness gneralized knee area, +abrasion 2cm X 2cm,  Left knee: mild swelling, mild tendernss laterally, mild abrasion  Right elbow: + abrasion  Left elbow + abrasion     Neurological:      Mental Status: She is alert and oriented to person, place, and time.   Psychiatric:         Mood and Affect: Mood normal.         Behavior: Behavior normal.

## 2024-07-12 NOTE — LETTER
July 12, 2024     Patient: Riana Jeronimo   YOB: 1965   Date of Visit: 7/12/2024       To Whom it May Concern:    Riana Jeronimo was seen in my clinic on 7/12/2024. She may return to work on 7/22/2024.  If you have any questions or concerns, please don't hesitate to call.         Sincerely,          Holley Noel MD        CC: No Recipients

## 2024-07-15 ENCOUNTER — OFFICE VISIT (OUTPATIENT)
Dept: FAMILY MEDICINE CLINIC | Facility: CLINIC | Age: 59
End: 2024-07-15
Payer: COMMERCIAL

## 2024-07-15 VITALS
HEART RATE: 100 BPM | WEIGHT: 261 LBS | TEMPERATURE: 97.8 F | DIASTOLIC BLOOD PRESSURE: 80 MMHG | BODY MASS INDEX: 46.25 KG/M2 | OXYGEN SATURATION: 95 % | SYSTOLIC BLOOD PRESSURE: 130 MMHG | RESPIRATION RATE: 17 BRPM | HEIGHT: 63 IN

## 2024-07-15 DIAGNOSIS — M17.0 ARTHRITIS OF BOTH KNEES: ICD-10-CM

## 2024-07-15 DIAGNOSIS — E66.01 MORBID (SEVERE) OBESITY DUE TO EXCESS CALORIES (HCC): ICD-10-CM

## 2024-07-15 DIAGNOSIS — G43.009 MIGRAINE WITHOUT AURA AND WITHOUT STATUS MIGRAINOSUS, NOT INTRACTABLE: ICD-10-CM

## 2024-07-15 DIAGNOSIS — M25.562 CHRONIC PAIN OF LEFT KNEE: ICD-10-CM

## 2024-07-15 DIAGNOSIS — Z00.00 ANNUAL PHYSICAL EXAM: Primary | ICD-10-CM

## 2024-07-15 DIAGNOSIS — Z12.11 COLON CANCER SCREENING: ICD-10-CM

## 2024-07-15 DIAGNOSIS — Z12.4 SCREENING FOR CERVICAL CANCER: ICD-10-CM

## 2024-07-15 DIAGNOSIS — G89.29 CHRONIC PAIN OF LEFT KNEE: ICD-10-CM

## 2024-07-15 PROBLEM — M17.11 PRIMARY OSTEOARTHRITIS OF RIGHT KNEE: Status: ACTIVE | Noted: 2024-07-15

## 2024-07-15 PROCEDURE — 99213 OFFICE O/P EST LOW 20 MIN: CPT | Performed by: FAMILY MEDICINE

## 2024-07-15 PROCEDURE — 99396 PREV VISIT EST AGE 40-64: CPT | Performed by: FAMILY MEDICINE

## 2024-07-15 RX ORDER — CELECOXIB 100 MG/1
100 CAPSULE ORAL 2 TIMES DAILY
Qty: 60 CAPSULE | Refills: 2 | Status: SHIPPED | OUTPATIENT
Start: 2024-07-15

## 2024-07-15 NOTE — ASSESSMENT & PLAN NOTE
Still having issues   She will try acupuncture   No ortho for now per patient   To try celebrex again

## 2024-07-15 NOTE — PROGRESS NOTES
Adult Annual Physical  Name: Riana Jeronimo      : 1965      MRN: [id number]  Encounter Provider: Melisa George MD  Encounter Date: 7/15/2024   Encounter department: [facility]    Assessment & Plan   1. Annual physical exam  2. Colon cancer screening  -     Cologuard  3. Migraine without aura and without status migrainosus, not intractable  Assessment & Plan:  Stable  Continue to monitor  4. Screening for cervical cancer  -     Ambulatory referral to Obstetrics / Gynecology; Future  5. Morbid (severe) obesity due to excess calories (HCC)  Assessment & Plan:  Diet, exercise and portion control   6. Arthritis of both knees  Assessment & Plan:  Still having issues   She will try acupuncture   No ortho for now per patient   To try celebrex again  7. Chronic pain of left knee  -     celecoxib (CeleBREX) 100 mg capsule; Take 1 capsule (100 mg total) by mouth 2 (two) times a day    Immunizations and preventive care screenings were discussed with patient today. Appropriate education was printed on patient's after visit summary.    Counseling:  Alcohol/drug use: discussed moderation in alcohol intake, the recommendations for healthy alcohol use, and avoidance of illicit drug use.  Dental Health: discussed importance of regular tooth brushing, flossing, and dental visits.  Injury prevention: discussed safety/seat belts, safety helmets, smoke detectors, carbon dioxide detectors, and smoking near bedding or upholstery.  Sexual health: discussed sexually transmitted diseases, partner selection, use of condoms, avoidance of unintended pregnancy, and contraceptive alternatives.  Exercise: the importance of regular exercise/physical activity was discussed. Recommend exercise 3-5 times per week for at least 30 minutes.          History of Present Illness     Right ankle and knee pain pain after tripping over a speed bump    Adult Annual Physical:  Patient presents for annual physical.     Diet and  Physical Activity:  - Diet/Nutrition: well balanced diet and consuming 3-5 servings of fruits/vegetables daily.  - Exercise: moderate cardiovascular exercise.    General Health:  - Sleep: sleeps well and 7-8 hours of sleep on average.  - Hearing: normal hearing bilateral ears.  - Vision: goes for regular eye exams.  - Dental: regular dental visits.    /GYN Health:  - Follows with GYN: no.   - Menopause: postmenopausal.   - History of STDs: no    Advanced Care Planning:  - Has an advanced directive?: no    - Has a durable medical POA?: no    - ACP document given to patient?: no      Review of Systems   Constitutional: Negative.    HENT: Negative.     Eyes: Negative.    Respiratory: Negative.     Cardiovascular: Negative.    Gastrointestinal: Negative.    Endocrine: Negative.    Genitourinary: Negative.    Musculoskeletal: Negative.    Skin: Negative.    Allergic/Immunologic: Negative.    Neurological: Negative.    Hematological: Negative.    Psychiatric/Behavioral: Negative.       Pertinent Medical History   none   Medical History Reviewed by provider this encounter:  Meds  Problems       Past Medical History   Past Medical History:   Diagnosis Date   • COVID-19 1/21/2021     Past Surgical History:   Procedure Laterality Date   • DILATION AND CURETTAGE OF UTERUS       Family History   Problem Relation Age of Onset   • Colon cancer Mother    • Hypertension Mother    • Endometrial cancer Mother    • Stomach cancer Mother    • Alcohol abuse Father    • Hypertension Sister    • No Known Problems Daughter    • Colon cancer Paternal Aunt    • Heart disease Family    • No Known Problems Son      Current Outpatient Medications on File Prior to Visit   Medication Sig Dispense Refill   • albuterol (PROVENTIL HFA,VENTOLIN HFA) 90 mcg/act inhaler Inhale 1-2 puffs every 6 (six) hours as needed for wheezing 18 g 0   • ALPRAZolam (XANAX) 0.5 mg tablet Take 1 tablet (0.5 mg total) by mouth daily at bedtime as needed for anxiety  30 tablet 0   • diclofenac sodium (VOLTAREN) 1 % Place on the skin     • fluticasone (Flovent HFA) 110 MCG/ACT inhaler Inhale 1 puff 2 (two) times a day Rinse mouth after use. 12 g 0   • pantoprazole (PROTONIX) 40 mg tablet Take 1 tablet (40 mg total) by mouth daily 30 tablet 2   • [DISCONTINUED] celecoxib (CeleBREX) 100 mg capsule Take 1 capsule (100 mg total) by mouth 2 (two) times a day 60 capsule 2     No current facility-administered medications on file prior to visit.     Allergies   Allergen Reactions   • Seasonal Ic  [Cholestatin]    • Sulfamethoxazole-Trimethoprim      Other reaction(s): yeast infection  [facility] - [id number]: reaction is rash      Current Outpatient Medications on File Prior to Visit   Medication Sig Dispense Refill   • albuterol (PROVENTIL HFA,VENTOLIN HFA) 90 mcg/act inhaler Inhale 1-2 puffs every 6 (six) hours as needed for wheezing 18 g 0   • ALPRAZolam (XANAX) 0.5 mg tablet Take 1 tablet (0.5 mg total) by mouth daily at bedtime as needed for anxiety 30 tablet 0   • diclofenac sodium (VOLTAREN) 1 % Place on the skin     • fluticasone (Flovent HFA) 110 MCG/ACT inhaler Inhale 1 puff 2 (two) times a day Rinse mouth after use. 12 g 0   • pantoprazole (PROTONIX) 40 mg tablet Take 1 tablet (40 mg total) by mouth daily 30 tablet 2   • [DISCONTINUED] celecoxib (CeleBREX) 100 mg capsule Take 1 capsule (100 mg total) by mouth 2 (two) times a day 60 capsule 2     No current facility-administered medications on file prior to visit.      Social History     Tobacco Use   • Smoking status: Former     Types: Cigarettes     Passive exposure: Never   • Smokeless tobacco: Never   • Tobacco comments:     former smoker quit 4 years ago.. smoked for at least 20 years.. half a pack to one pack of cigarettes a day   Vaping Use   • Vaping status: Never Used   Substance and Sexual Activity   • Alcohol use: No   • Drug use: No   • Sexual activity: Yes     Partners: Male       Objective     /80 (BP  "Location: Left arm, Patient Position: Sitting, Cuff Size: Standard)   Pulse 100   Temp 97.8 °F (36.6 °C) (Tympanic)   Resp 17   Ht 5' 2.99\" (1.6 m)   Wt 118 kg (261 lb)   SpO2 95%   BMI 46.25 kg/m²     Physical Exam  Vitals and nursing note reviewed.   Constitutional:       Appearance: Normal appearance. She is well-developed.   HENT:      Head: Normocephalic and atraumatic.      Right Ear: Tympanic membrane normal.      Left Ear: Tympanic membrane normal.      Nose: Nose normal.      Mouth/Throat:      Mouth: Mucous membranes are moist.   Eyes:      Pupils: Pupils are equal, round, and reactive to light.   Cardiovascular:      Rate and Rhythm: Normal rate and regular rhythm.      Pulses: Normal pulses.      Heart sounds: Normal heart sounds.   Pulmonary:      Effort: Pulmonary effort is normal. No respiratory distress.      Breath sounds: Normal breath sounds. No wheezing.   Abdominal:      General: Bowel sounds are normal.      Palpations: Abdomen is soft.   Musculoskeletal:         General: No deformity. Normal range of motion.      Cervical back: Normal range of motion and neck supple.   Skin:     General: Skin is warm.   Neurological:      General: No focal deficit present.      Mental Status: She is alert and oriented to person, place, and time.   Psychiatric:         Mood and Affect: Mood normal.         Behavior: Behavior normal.         "

## 2024-08-08 ENCOUNTER — TELEPHONE (OUTPATIENT)
Age: 59
End: 2024-08-08

## 2024-08-08 NOTE — TELEPHONE ENCOUNTER
Spoke to patient,note written. Patient stated she will follow up with office to schedule if she needs to.

## 2024-08-08 NOTE — TELEPHONE ENCOUNTER
Patient is requesting a letter from Dr. George excusing her from work today 08/08  She worked a half-day and left early due to severe anxiety  She would like a call back when it is completed so that she can pick it up today at the office

## 2024-08-09 ENCOUNTER — VBI (OUTPATIENT)
Dept: ADMINISTRATIVE | Facility: OTHER | Age: 59
End: 2024-08-09

## 2024-08-09 NOTE — TELEPHONE ENCOUNTER
08/09/24 11:35 AM     Chart reviewed for Pap Smear (HPV) aka Cervical Cancer Screening ; nothing is submitted to the patient's insurance at this time.     VAN RAMÍREZ MA   [facility]

## 2024-10-21 ENCOUNTER — HOSPITAL ENCOUNTER (OUTPATIENT)
Dept: ULTRASOUND IMAGING | Facility: CLINIC | Age: 59
Discharge: HOME | End: 2024-10-21
Payer: COMMERCIAL

## 2024-10-21 ENCOUNTER — HOSPITAL ENCOUNTER (OUTPATIENT)
Dept: MAMMOGRAPHY | Facility: CLINIC | Age: 59
Discharge: HOME | End: 2024-10-21
Payer: COMMERCIAL

## 2024-10-21 VITALS — HEIGHT: 62 IN | BODY MASS INDEX: 49.69 KG/M2 | WEIGHT: 270 LBS

## 2024-10-21 DIAGNOSIS — R92.8 ABNORMAL FINDINGS ON DIAGNOSTIC IMAGING OF BREAST: ICD-10-CM

## 2024-10-21 PROCEDURE — 76642 ULTRASOUND BREAST LIMITED: CPT

## 2024-10-21 PROCEDURE — 77065 DX MAMMO INCL CAD UNI: CPT

## 2024-10-21 PROCEDURE — G0279 TOMOSYNTHESIS, MAMMO: HCPCS

## 2024-11-07 DIAGNOSIS — M25.562 CHRONIC PAIN OF LEFT KNEE: ICD-10-CM

## 2024-11-07 DIAGNOSIS — G89.29 CHRONIC PAIN OF LEFT KNEE: ICD-10-CM

## 2024-11-07 RX ORDER — CELECOXIB 100 MG/1
100 CAPSULE ORAL 2 TIMES DAILY
Qty: 60 CAPSULE | Refills: 5 | Status: SHIPPED | OUTPATIENT
Start: 2024-11-07

## 2024-12-14 ENCOUNTER — VBI (OUTPATIENT)
Dept: ADMINISTRATIVE | Facility: OTHER | Age: 59
End: 2024-12-14

## 2024-12-15 NOTE — TELEPHONE ENCOUNTER
12/14/24 7:24 PM     Chart reviewed for   Cervical Cancer Screening    ; nothing is submitted to the patient's insurance at this time.     VAN RAMÍREZ MA   [facility]

## 2025-01-16 ENCOUNTER — RA CDI HCC (OUTPATIENT)
Dept: OTHER | Facility: HOSPITAL | Age: 60
End: 2025-01-16

## 2025-01-16 NOTE — PROGRESS NOTES
Please review if this dx is applicable to the patient's condition and assess and document, if applicable in next visit        HCC coding opportunities          Chart Reviewed number of suggestions sent to Provider: 1     Patients Insurance        Commercial Insurance: [employer]

## 2025-01-17 ENCOUNTER — OFFICE VISIT (OUTPATIENT)
Dept: FAMILY MEDICINE CLINIC | Facility: CLINIC | Age: 60
End: 2025-01-17
Payer: COMMERCIAL

## 2025-01-17 VITALS
SYSTOLIC BLOOD PRESSURE: 120 MMHG | HEIGHT: 62 IN | BODY MASS INDEX: 47.48 KG/M2 | HEART RATE: 87 BPM | OXYGEN SATURATION: 97 % | TEMPERATURE: 98.4 F | WEIGHT: 258 LBS | RESPIRATION RATE: 17 BRPM | DIASTOLIC BLOOD PRESSURE: 80 MMHG

## 2025-01-17 DIAGNOSIS — J45.20 MILD INTERMITTENT ASTHMA WITHOUT COMPLICATION: ICD-10-CM

## 2025-01-17 DIAGNOSIS — M17.0 ARTHRITIS OF BOTH KNEES: ICD-10-CM

## 2025-01-17 DIAGNOSIS — E66.01 MORBID (SEVERE) OBESITY DUE TO EXCESS CALORIES (HCC): Primary | ICD-10-CM

## 2025-01-17 DIAGNOSIS — G43.009 MIGRAINE WITHOUT AURA AND WITHOUT STATUS MIGRAINOSUS, NOT INTRACTABLE: ICD-10-CM

## 2025-01-17 DIAGNOSIS — K21.9 GASTROESOPHAGEAL REFLUX DISEASE WITHOUT ESOPHAGITIS: ICD-10-CM

## 2025-01-17 DIAGNOSIS — Z12.31 ENCOUNTER FOR SCREENING MAMMOGRAM FOR BREAST CANCER: ICD-10-CM

## 2025-01-17 PROBLEM — M17.11 PRIMARY OSTEOARTHRITIS OF RIGHT KNEE: Status: RESOLVED | Noted: 2024-07-15 | Resolved: 2025-01-17

## 2025-01-17 PROCEDURE — 99214 OFFICE O/P EST MOD 30 MIN: CPT | Performed by: FAMILY MEDICINE

## 2025-01-17 RX ORDER — PANTOPRAZOLE SODIUM 40 MG/1
40 TABLET, DELAYED RELEASE ORAL DAILY
Qty: 30 TABLET | Refills: 2 | Status: SHIPPED | OUTPATIENT
Start: 2025-01-17

## 2025-01-17 RX ORDER — MOMETASONE FUROATE 50 UG/1
2 AEROSOL RESPIRATORY (INHALATION) 2 TIMES DAILY
Qty: 13 G | Refills: 0 | Status: SHIPPED | OUTPATIENT
Start: 2025-01-17

## 2025-01-17 NOTE — ASSESSMENT & PLAN NOTE
Diet, exercise and portion control discussed   Orders:  •  CBC and differential  •  Comprehensive metabolic panel  •  Lipid panel  •  TSH, 3rd generation with Free T4 reflex

## 2025-01-17 NOTE — ASSESSMENT & PLAN NOTE
Struggling with shortness of breath with exertion   To start daily inhaler again   Orders:  •  Mometasone Furoate (Asmanex HFA) 50 MCG/ACT AERO; Inhale 2 puffs 2 (two) times a day Rinse mouth after use.

## 2025-01-17 NOTE — ASSESSMENT & PLAN NOTE
Stable on protonix   Refilled today  Orders:  •  pantoprazole (PROTONIX) 40 mg tablet; Take 1 tablet (40 mg total) by mouth daily

## 2025-01-17 NOTE — ASSESSMENT & PLAN NOTE
Orders:  •  Mometasone Furoate (Asmanex HFA) 50 MCG/ACT AERO; Inhale 2 puffs 2 (two) times a day Rinse mouth after use.

## 2025-01-17 NOTE — PROGRESS NOTES
Name: Riana Jeronimo      : 1965      MRN: [id number]  Encounter Provider: Melisa George MD  Encounter Date: 2025   Encounter department: [facility]  :  Assessment & Plan  Morbid (severe) obesity due to excess calories (HCC)  Diet, exercise and portion control discussed   Orders:  •  CBC and differential  •  Comprehensive metabolic panel  •  Lipid panel  •  TSH, 3rd generation with Free T4 reflex    Gastroesophageal reflux disease without esophagitis  Stable on protonix   Refilled today  Orders:  •  pantoprazole (PROTONIX) 40 mg tablet; Take 1 tablet (40 mg total) by mouth daily    Mild intermittent asthma without complication  Struggling with shortness of breath with exertion   To start daily inhaler again   Orders:  •  Mometasone Furoate (Asmanex HFA) 50 MCG/ACT AERO; Inhale 2 puffs 2 (two) times a day Rinse mouth after use.    Migraine without aura and without status migrainosus, not intractable  Doing well   No recent issues        Arthritis of both knees  Doing better on celebrex twice a day  Continue to monitor        Mild intermittent asthma without complication    Orders:  •  Mometasone Furoate (Asmanex HFA) 50 MCG/ACT AERO; Inhale 2 puffs 2 (two) times a day Rinse mouth after use.    Encounter for screening mammogram for breast cancer    Orders:  •  Mammo screening bilateral w 3d and cad; Future           History of Present Illness     HPI  Here for follow up  Shortness of breath with exertion with cold weather and humidity   Not using daily inhaler for years  Lost weight since last visit as she is eating better      Review of Systems   Constitutional: Negative.    HENT: Negative.     Eyes: Negative.    Respiratory: Negative.     Cardiovascular: Negative.    Gastrointestinal: Negative.    Genitourinary: Negative.    Musculoskeletal:  Positive for arthralgias.   Hematological: Negative.    Psychiatric/Behavioral: Negative.         Objective   /80 (BP Location: Left  "arm, Patient Position: Sitting, Cuff Size: Standard)   Pulse 87   Temp 98.4 °F (36.9 °C) (Tympanic)   Resp 17   Ht 5' 2\" (1.575 m)   Wt 117 kg (258 lb)   SpO2 97%   BMI 47.19 kg/m²      Physical Exam  Vitals and nursing note reviewed.   Constitutional:       Appearance: Normal appearance.   HENT:      Right Ear: Tympanic membrane normal.      Left Ear: Tympanic membrane normal.      Nose: Nose normal.      Mouth/Throat:      Mouth: Mucous membranes are moist.   Cardiovascular:      Rate and Rhythm: Normal rate and regular rhythm.      Pulses: Normal pulses.      Heart sounds: Normal heart sounds.   Pulmonary:      Effort: Pulmonary effort is normal.      Breath sounds: Normal breath sounds.   Musculoskeletal:         General: Normal range of motion.      Cervical back: Normal range of motion.   Neurological:      General: No focal deficit present.      Mental Status: She is alert and oriented to person, place, and time.   Psychiatric:         Mood and Affect: Mood normal.         Behavior: Behavior normal.         "

## 2025-04-11 ENCOUNTER — VBI (OUTPATIENT)
Dept: ADMINISTRATIVE | Facility: OTHER | Age: 60
End: 2025-04-11

## 2025-04-11 NOTE — TELEPHONE ENCOUNTER
04/11/25 9:07 AM     Chart reviewed for   Cervical Cancer Screening    ; nothing is submitted to the patient's insurance at this time.     VAN RAMÍREZ MA   [facility]

## 2025-04-16 ENCOUNTER — TELEPHONE (OUTPATIENT)
Dept: FAMILY MEDICINE CLINIC | Facility: CLINIC | Age: 60
End: 2025-04-16

## 2025-07-07 ENCOUNTER — OFFICE VISIT (OUTPATIENT)
Dept: FAMILY MEDICINE CLINIC | Facility: CLINIC | Age: 60
End: 2025-07-07

## 2025-07-07 VITALS
TEMPERATURE: 97.8 F | RESPIRATION RATE: 17 BRPM | OXYGEN SATURATION: 100 % | HEART RATE: 101 BPM | HEIGHT: 62 IN | BODY MASS INDEX: 45.08 KG/M2 | SYSTOLIC BLOOD PRESSURE: 130 MMHG | WEIGHT: 245 LBS | DIASTOLIC BLOOD PRESSURE: 80 MMHG

## 2025-07-07 DIAGNOSIS — G89.29 CHRONIC PAIN OF LEFT KNEE: ICD-10-CM

## 2025-07-07 DIAGNOSIS — K21.9 GASTROESOPHAGEAL REFLUX DISEASE WITHOUT ESOPHAGITIS: ICD-10-CM

## 2025-07-07 DIAGNOSIS — E66.01 MORBID (SEVERE) OBESITY DUE TO EXCESS CALORIES (HCC): ICD-10-CM

## 2025-07-07 DIAGNOSIS — J45.20 MILD INTERMITTENT ASTHMA WITHOUT COMPLICATION: ICD-10-CM

## 2025-07-07 DIAGNOSIS — M25.562 CHRONIC PAIN OF LEFT KNEE: ICD-10-CM

## 2025-07-07 DIAGNOSIS — Z00.00 ANNUAL PHYSICAL EXAM: Primary | ICD-10-CM

## 2025-07-07 DIAGNOSIS — M54.50 CHRONIC MIDLINE LOW BACK PAIN WITHOUT SCIATICA: ICD-10-CM

## 2025-07-07 DIAGNOSIS — G89.29 CHRONIC MIDLINE LOW BACK PAIN WITHOUT SCIATICA: ICD-10-CM

## 2025-07-07 RX ORDER — CELECOXIB 100 MG/1
100 CAPSULE ORAL 2 TIMES DAILY
Qty: 60 CAPSULE | Refills: 5 | Status: SHIPPED | OUTPATIENT
Start: 2025-07-07

## 2025-07-07 RX ORDER — CYCLOBENZAPRINE HCL 10 MG
10 TABLET ORAL 3 TIMES DAILY PRN
COMMUNITY

## 2025-07-07 NOTE — PROGRESS NOTES
Adult Annual Physical  Name: Riana Jeronimo      : 1965      MRN: [id number]  Encounter Provider: Melisa George MD  Encounter Date: 2025   Encounter department: [facility]    :  Assessment & Plan  Annual physical exam  Due for labs  Ordered in chart and she will go this week     Orders:    Ambulatory referral to Obstetrics / Gynecology; Future    Morbid (severe) obesity due to excess calories (HCC)  Lost 13 pounds since last visit   Continue diet, exercise and portion control        Chronic pain of left knee  Stable  Refilled meds today     Orders:    celecoxib (CeleBREX) 100 mg capsule; Take 1 capsule (100 mg total) by mouth 2 (two) times a day    Gastroesophageal reflux disease without esophagitis  Protonix prn   Watching her diet       Mild intermittent asthma without complication  Not on daily inhaler at this time   Albuterol prn       Chronic midline low back pain without sciatica  Cyclobenzaprine as needed   In PT currently   Using TENS unit            Preventive Screenings:  - Diabetes Screening: screening up-to-date  - Cholesterol Screening: orders placed   - Hepatitis C screening: screening up-to-date   - HIV screening: screening up-to-date   - Cervical cancer screening: orders placed   - Breast cancer screening: screening up-to-date   - Colon cancer screening: orders placed   - Lung cancer screening: screening not indicated     Immunizations:  - Immunizations due: Zoster (Shingrix)    Counseling/Anticipatory Guidance:  - Alcohol: discussed moderation in alcohol intake and recommendations for healthy alcohol use.   - Tobacco use: discussed harms of tobacco use and management options for quitting.   - Dental health: discussed importance of regular tooth brushing, flossing, and dental visits.   - Sexual health: discussed sexually transmitted diseases, partner selection, use of condoms, avoidance of unintended pregnancy, and contraceptive alternatives.   - Diet: discussed  recommendations for a healthy/well-balanced diet.   - Exercise: the importance of regular exercise/physical activity was discussed. Recommend exercise 3-5 times per week for at least 30 minutes.   - Injury prevention: discussed safety/seat belts, safety helmets, smoke detectors, carbon monoxide detectors, and smoking near bedding or upholstery.          History of Present Illness     Adult Annual Physical:  Patient presents for annual physical.     Diet and Physical Activity:  - Diet/Nutrition: consuming 3-5 servings of fruits/vegetables daily and no special diet.  - Exercise: walking.    General Health:  - Sleep: sleeps well.  - Hearing: normal hearing bilateral ears.  - Vision: vision problems, wears glasses and most recent eye exam > 1 year ago.  - Dental: brushes teeth twice daily and no dental visits for > 1 year.    /GYN Health:  - Follows with GYN: no.   - Menopause: postmenopausal.   - History of STDs: no    Advanced Care Planning:  - Has an advanced directive?: no    - Has a durable medical POA?: no    - ACP document given to patient?: no      Review of Systems   Constitutional:  Negative for chills and fever.   HENT:  Negative for ear pain and sore throat.    Eyes:  Negative for pain and visual disturbance.   Respiratory:  Negative for cough and shortness of breath.    Cardiovascular:  Negative for chest pain and palpitations.   Gastrointestinal:  Negative for abdominal pain and vomiting.   Genitourinary:  Negative for dysuria and hematuria.   Musculoskeletal:  Negative for arthralgias and back pain.   Skin:  Negative for color change and rash.   Allergic/Immunologic: Negative.    Neurological: Negative.  Negative for seizures and syncope.   Hematological: Negative.    All other systems reviewed and are negative.    Medical History Reviewed by provider this encounter:  Problems     .  Past Medical History   Past Medical History[1]  Past Surgical History[2]  Family History[3]   reports that she has quit  "smoking. Her smoking use included cigarettes. She has never been exposed to tobacco smoke. She has never used smokeless tobacco. She reports that she does not drink alcohol and does not use drugs.  Current Outpatient Medications   Medication Instructions    albuterol (PROVENTIL HFA,VENTOLIN HFA) 90 mcg/act inhaler 1-2 puffs, Inhalation, Every 6 hours PRN    ALPRAZolam (XANAX) 0.5 mg, Oral, Daily at bedtime PRN    celecoxib (CELEBREX) 100 mg, Oral, 2 times daily    cyclobenzaprine (FLEXERIL) 10 mg, 3 times daily PRN    diclofenac sodium (VOLTAREN) 1 % Place on the skin    Mometasone Furoate (Asmanex HFA) 50 MCG/ACT AERO 2 puffs, Inhalation, 2 times daily, Rinse mouth after use.    pantoprazole (PROTONIX) 40 mg, Oral, Daily   Allergies[4]   Medications Ordered Prior to Encounter[5]   Social History[6]    Objective   /80 (BP Location: Left arm, Patient Position: Sitting, Cuff Size: Standard)   Pulse 101   Temp 97.8 °F (36.6 °C) (Tympanic)   Resp 17   Ht 5' 2\" (1.575 m)   Wt 111 kg (245 lb)   SpO2 100%   BMI 44.81 kg/m²     Physical Exam  Vitals and nursing note reviewed.   Constitutional:       Appearance: Normal appearance. She is well-developed.   HENT:      Head: Normocephalic and atraumatic.      Right Ear: Tympanic membrane normal.      Left Ear: Tympanic membrane normal.      Nose: Nose normal.      Mouth/Throat:      Mouth: Mucous membranes are moist.     Eyes:      Pupils: Pupils are equal, round, and reactive to light.     Neck:      Thyroid: No thyromegaly.     Cardiovascular:      Rate and Rhythm: Normal rate and regular rhythm.      Pulses: Normal pulses.      Heart sounds: Normal heart sounds. No murmur heard.  Pulmonary:      Effort: Pulmonary effort is normal.      Breath sounds: Normal breath sounds.   Abdominal:      General: Bowel sounds are normal.      Palpations: Abdomen is soft.     Musculoskeletal:         General: No deformity. Normal range of motion.      Cervical back: Normal " range of motion and neck supple.     Skin:     General: Skin is warm.      Capillary Refill: Capillary refill takes less than 2 seconds.      Findings: No erythema or rash.     Neurological:      General: No focal deficit present.      Mental Status: She is alert and oriented to person, place, and time.      Deep Tendon Reflexes: Reflexes are normal and symmetric.     Psychiatric:         Mood and Affect: Mood normal.         Behavior: Behavior normal.              [1]   Past Medical History:  Diagnosis Date    Anemia When i was young    Arthritis In dr quintana files    Asthma In my dr quintana files    Breast cyst     COVID-19 01/21/2021    Difficulty walking In dr wyman files   [2]   Past Surgical History:  Procedure Laterality Date    DILATION AND CURETTAGE OF UTERUS     [3]   Family History  Problem Relation Name Age of Onset    Colon cancer Mother Zainab     Hypertension Mother Zainab     Endometrial cancer Mother Zainab     Stomach cancer Mother Zainab     Arthritis Mother Zainab     Cancer Mother Zainab     Alcohol abuse Father      Hypertension Sister      No Known Problems Daughter      Diabetes Paternal Grandmother Grandma     Heart disease Maternal Aunt Cece     Colon cancer Paternal Aunt      Heart disease Family      No Known Problems Son      Heart disease Maternal Uncle Yannick and nicole    [4]   Allergies  Allergen Reactions    Seasonal Ic  [Cholestatin]     Sulfamethoxazole-Trimethoprim      Other reaction(s): yeast infection  Annotation - [id number]: reaction is rash   [5]   Current Outpatient Medications on File Prior to Visit   Medication Sig Dispense Refill    albuterol (PROVENTIL HFA,VENTOLIN HFA) 90 mcg/act inhaler Inhale 1-2 puffs every 6 (six) hours as needed for wheezing 18 g 0    ALPRAZolam (XANAX) 0.5 mg tablet Take 1 tablet (0.5 mg total) by mouth daily at bedtime as needed for anxiety 30 tablet 0    cyclobenzaprine (FLEXERIL) 10 mg tablet Take 10 mg by mouth 3 (three) times a day as needed for muscle spasms       diclofenac sodium (VOLTAREN) 1 % Place on the skin      Mometasone Furoate (Asmanex HFA) 50 MCG/ACT AERO Inhale 2 puffs 2 (two) times a day Rinse mouth after use. 13 g 0    pantoprazole (PROTONIX) 40 mg tablet Take 1 tablet (40 mg total) by mouth daily 30 tablet 2     No current facility-administered medications on file prior to visit.   [6]   Social History  Tobacco Use    Smoking status: Former     Types: Cigarettes     Passive exposure: Never    Smokeless tobacco: Never    Tobacco comments:     former smoker quit 4 years ago.. smoked for at least 20 years.. half a pack to one pack of cigarettes a day   Vaping Use    Vaping status: Never Used   Substance and Sexual Activity    Alcohol use: No    Drug use: No    Sexual activity: Yes     Partners: Male

## 2025-07-15 ENCOUNTER — APPOINTMENT (OUTPATIENT)
Dept: LAB | Facility: CLINIC | Age: 60
End: 2025-07-15
Attending: FAMILY MEDICINE
Payer: COMMERCIAL

## 2025-07-15 LAB
ALBUMIN SERPL BCG-MCNC: 4.3 G/DL (ref 3.5–5)
ALP SERPL-CCNC: 88 U/L (ref 34–104)
ALT SERPL W P-5'-P-CCNC: 22 U/L (ref 7–52)
ANION GAP SERPL CALCULATED.3IONS-SCNC: 6 MMOL/L (ref 4–13)
AST SERPL W P-5'-P-CCNC: 16 U/L (ref 13–39)
BASOPHILS # BLD AUTO: 0.03 THOUSANDS/ÂΜL (ref 0–0.1)
BASOPHILS NFR BLD AUTO: 1 % (ref 0–1)
BILIRUB SERPL-MCNC: 0.47 MG/DL (ref 0.2–1)
BUN SERPL-MCNC: 12 MG/DL (ref 5–25)
CALCIUM SERPL-MCNC: 9.1 MG/DL (ref 8.4–10.2)
CHLORIDE SERPL-SCNC: 104 MMOL/L (ref 96–108)
CHOLEST SERPL-MCNC: 176 MG/DL (ref ?–200)
CO2 SERPL-SCNC: 30 MMOL/L (ref 21–32)
CREAT SERPL-MCNC: 0.76 MG/DL (ref 0.6–1.3)
EOSINOPHIL # BLD AUTO: 0.17 THOUSAND/ÂΜL (ref 0–0.61)
EOSINOPHIL NFR BLD AUTO: 3 % (ref 0–6)
ERYTHROCYTE [DISTWIDTH] IN BLOOD BY AUTOMATED COUNT: 13.2 % (ref 11.6–15.1)
GFR SERPL CREATININE-BSD FRML MDRD: 86 ML/MIN/1.73SQ M
GLUCOSE P FAST SERPL-MCNC: 96 MG/DL (ref 65–99)
HCT VFR BLD AUTO: 39.9 % (ref 34.8–46.1)
HDLC SERPL-MCNC: 53 MG/DL
HGB BLD-MCNC: 13.2 G/DL (ref 11.5–15.4)
IMM GRANULOCYTES # BLD AUTO: 0.02 THOUSAND/UL (ref 0–0.2)
IMM GRANULOCYTES NFR BLD AUTO: 0 % (ref 0–2)
LDLC SERPL CALC-MCNC: 105 MG/DL (ref 0–100)
LYMPHOCYTES # BLD AUTO: 1.63 THOUSANDS/ÂΜL (ref 0.6–4.47)
LYMPHOCYTES NFR BLD AUTO: 25 % (ref 14–44)
MCH RBC QN AUTO: 30 PG (ref 26.8–34.3)
MCHC RBC AUTO-ENTMCNC: 33.1 G/DL (ref 31.4–37.4)
MCV RBC AUTO: 91 FL (ref 82–98)
MONOCYTES # BLD AUTO: 0.39 THOUSAND/ÂΜL (ref 0.17–1.22)
MONOCYTES NFR BLD AUTO: 6 % (ref 4–12)
NEUTROPHILS # BLD AUTO: 4.31 THOUSANDS/ÂΜL (ref 1.85–7.62)
NEUTS SEG NFR BLD AUTO: 65 % (ref 43–75)
NONHDLC SERPL-MCNC: 123 MG/DL
NRBC BLD AUTO-RTO: 0 /100 WBCS
PLATELET # BLD AUTO: 268 THOUSANDS/UL (ref 149–390)
PMV BLD AUTO: 10 FL (ref 8.9–12.7)
POTASSIUM SERPL-SCNC: 4.1 MMOL/L (ref 3.5–5.3)
PROT SERPL-MCNC: 7.2 G/DL (ref 6.4–8.4)
RBC # BLD AUTO: 4.4 MILLION/UL (ref 3.81–5.12)
SODIUM SERPL-SCNC: 140 MMOL/L (ref 135–147)
TRIGL SERPL-MCNC: 92 MG/DL (ref ?–150)
TSH SERPL DL<=0.05 MIU/L-ACNC: 1.53 UIU/ML (ref 0.45–4.5)
WBC # BLD AUTO: 6.55 THOUSAND/UL (ref 4.31–10.16)